# Patient Record
Sex: MALE | Race: WHITE | NOT HISPANIC OR LATINO | Employment: FULL TIME | URBAN - METROPOLITAN AREA
[De-identification: names, ages, dates, MRNs, and addresses within clinical notes are randomized per-mention and may not be internally consistent; named-entity substitution may affect disease eponyms.]

---

## 2019-03-09 ENCOUNTER — APPOINTMENT (EMERGENCY)
Dept: RADIOLOGY | Facility: HOSPITAL | Age: 59
End: 2019-03-09
Payer: COMMERCIAL

## 2019-03-09 ENCOUNTER — APPOINTMENT (OUTPATIENT)
Dept: MRI IMAGING | Facility: HOSPITAL | Age: 59
End: 2019-03-09
Payer: COMMERCIAL

## 2019-03-09 ENCOUNTER — APPOINTMENT (EMERGENCY)
Dept: CT IMAGING | Facility: HOSPITAL | Age: 59
End: 2019-03-09
Payer: COMMERCIAL

## 2019-03-09 ENCOUNTER — HOSPITAL ENCOUNTER (OUTPATIENT)
Facility: HOSPITAL | Age: 59
Setting detail: OBSERVATION
Discharge: HOME | End: 2019-03-10
Attending: EMERGENCY MEDICINE | Admitting: FAMILY MEDICINE
Payer: COMMERCIAL

## 2019-03-09 DIAGNOSIS — G51.0 BELL'S PALSY: ICD-10-CM

## 2019-03-09 DIAGNOSIS — R29.810 FACIAL DROOP: Primary | ICD-10-CM

## 2019-03-09 LAB
ALBUMIN SERPL BCP-MCNC: 3.6 G/DL (ref 3.5–5)
ALP SERPL-CCNC: 64 U/L (ref 46–116)
ALT SERPL W P-5'-P-CCNC: 35 U/L (ref 12–78)
ANION GAP BLD CALC-SCNC: 16 MMOL/L (ref 4–13)
ANION GAP SERPL CALCULATED.3IONS-SCNC: 7 MMOL/L (ref 4–13)
AST SERPL W P-5'-P-CCNC: 30 U/L (ref 5–45)
BASOPHILS # BLD AUTO: 0.02 THOUSANDS/ΜL (ref 0–0.1)
BASOPHILS NFR BLD AUTO: 0 % (ref 0–1)
BILIRUB SERPL-MCNC: 0.88 MG/DL (ref 0.2–1)
BUN BLD-MCNC: 21 MG/DL (ref 5–25)
BUN SERPL-MCNC: 22 MG/DL (ref 5–25)
CA-I BLD-SCNC: 1.15 MMOL/L (ref 1.12–1.32)
CALCIUM SERPL-MCNC: 8.6 MG/DL (ref 8.3–10.1)
CHLORIDE BLD-SCNC: 107 MMOL/L (ref 100–108)
CHLORIDE SERPL-SCNC: 106 MMOL/L (ref 100–108)
CO2 SERPL-SCNC: 27 MMOL/L (ref 21–32)
CREAT BLD-MCNC: 1.2 MG/DL (ref 0.6–1.3)
CREAT SERPL-MCNC: 1.22 MG/DL (ref 0.6–1.3)
EOSINOPHIL # BLD AUTO: 0.09 THOUSAND/ΜL (ref 0–0.61)
EOSINOPHIL NFR BLD AUTO: 2 % (ref 0–6)
ERYTHROCYTE [DISTWIDTH] IN BLOOD BY AUTOMATED COUNT: 12.6 % (ref 11.6–15.1)
GFR SERPL CREATININE-BSD FRML MDRD: 65 ML/MIN/1.73SQ M
GFR SERPL CREATININE-BSD FRML MDRD: 67 ML/MIN/1.73SQ M
GLUCOSE SERPL-MCNC: 101 MG/DL (ref 65–140)
GLUCOSE SERPL-MCNC: 105 MG/DL (ref 65–140)
HCT VFR BLD AUTO: 45.1 % (ref 36.5–49.3)
HCT VFR BLD CALC: 44 % (ref 36.5–49.3)
HGB BLD-MCNC: 14.6 G/DL (ref 12–17)
HGB BLDA-MCNC: 15 G/DL (ref 12–17)
IMM GRANULOCYTES # BLD AUTO: 0.01 THOUSAND/UL (ref 0–0.2)
IMM GRANULOCYTES NFR BLD AUTO: 0 % (ref 0–2)
LYMPHOCYTES # BLD AUTO: 1.42 THOUSANDS/ΜL (ref 0.6–4.47)
LYMPHOCYTES NFR BLD AUTO: 28 % (ref 14–44)
MCH RBC QN AUTO: 30.2 PG (ref 26.8–34.3)
MCHC RBC AUTO-ENTMCNC: 32.4 G/DL (ref 31.4–37.4)
MCV RBC AUTO: 93 FL (ref 82–98)
MONOCYTES # BLD AUTO: 0.5 THOUSAND/ΜL (ref 0.17–1.22)
MONOCYTES NFR BLD AUTO: 10 % (ref 4–12)
NEUTROPHILS # BLD AUTO: 3 THOUSANDS/ΜL (ref 1.85–7.62)
NEUTS SEG NFR BLD AUTO: 60 % (ref 43–75)
NRBC BLD AUTO-RTO: 0 /100 WBCS
PCO2 BLD: 24 MMOL/L (ref 21–32)
PLATELET # BLD AUTO: 212 THOUSANDS/UL (ref 149–390)
PMV BLD AUTO: 10.5 FL (ref 8.9–12.7)
POTASSIUM BLD-SCNC: 4.3 MMOL/L (ref 3.5–5.3)
POTASSIUM SERPL-SCNC: 4.4 MMOL/L (ref 3.5–5.3)
PROT SERPL-MCNC: 6.6 G/DL (ref 6.4–8.2)
RBC # BLD AUTO: 4.84 MILLION/UL (ref 3.88–5.62)
SODIUM BLD-SCNC: 141 MMOL/L (ref 136–145)
SODIUM SERPL-SCNC: 140 MMOL/L (ref 136–145)
SPECIMEN SOURCE: ABNORMAL
TROPONIN I SERPL-MCNC: <0.02 NG/ML
WBC # BLD AUTO: 5.04 THOUSAND/UL (ref 4.31–10.16)

## 2019-03-09 PROCEDURE — 70551 MRI BRAIN STEM W/O DYE: CPT

## 2019-03-09 PROCEDURE — 36415 COLL VENOUS BLD VENIPUNCTURE: CPT | Performed by: EMERGENCY MEDICINE

## 2019-03-09 PROCEDURE — 84484 ASSAY OF TROPONIN QUANT: CPT | Performed by: EMERGENCY MEDICINE

## 2019-03-09 PROCEDURE — 99285 EMERGENCY DEPT VISIT HI MDM: CPT

## 2019-03-09 PROCEDURE — 99219 PR INITIAL OBSERVATION CARE/DAY 50 MINUTES: CPT | Performed by: FAMILY MEDICINE

## 2019-03-09 PROCEDURE — 99291 CRITICAL CARE FIRST HOUR: CPT | Performed by: PSYCHIATRY & NEUROLOGY

## 2019-03-09 PROCEDURE — 70498 CT ANGIOGRAPHY NECK: CPT

## 2019-03-09 PROCEDURE — 85025 COMPLETE CBC W/AUTO DIFF WBC: CPT | Performed by: EMERGENCY MEDICINE

## 2019-03-09 PROCEDURE — 86235 NUCLEAR ANTIGEN ANTIBODY: CPT | Performed by: PSYCHIATRY & NEUROLOGY

## 2019-03-09 PROCEDURE — 71045 X-RAY EXAM CHEST 1 VIEW: CPT

## 2019-03-09 PROCEDURE — 82164 ANGIOTENSIN I ENZYME TEST: CPT | Performed by: PSYCHIATRY & NEUROLOGY

## 2019-03-09 PROCEDURE — 70496 CT ANGIOGRAPHY HEAD: CPT

## 2019-03-09 PROCEDURE — 80047 BASIC METABLC PNL IONIZED CA: CPT

## 2019-03-09 PROCEDURE — 85014 HEMATOCRIT: CPT

## 2019-03-09 PROCEDURE — 93005 ELECTROCARDIOGRAM TRACING: CPT

## 2019-03-09 PROCEDURE — 80053 COMPREHEN METABOLIC PANEL: CPT | Performed by: EMERGENCY MEDICINE

## 2019-03-09 PROCEDURE — 70450 CT HEAD/BRAIN W/O DYE: CPT

## 2019-03-09 PROCEDURE — 36415 COLL VENOUS BLD VENIPUNCTURE: CPT | Performed by: PSYCHIATRY & NEUROLOGY

## 2019-03-09 RX ORDER — ATORVASTATIN CALCIUM 40 MG/1
40 TABLET, FILM COATED ORAL EVERY EVENING
Status: DISCONTINUED | OUTPATIENT
Start: 2019-03-09 | End: 2019-03-10 | Stop reason: HOSPADM

## 2019-03-09 RX ORDER — PANTOPRAZOLE SODIUM 40 MG/1
40 TABLET, DELAYED RELEASE ORAL
Status: DISCONTINUED | OUTPATIENT
Start: 2019-03-09 | End: 2019-03-10 | Stop reason: HOSPADM

## 2019-03-09 RX ORDER — ASPIRIN 81 MG/1
81 TABLET, CHEWABLE ORAL DAILY
Status: DISCONTINUED | OUTPATIENT
Start: 2019-03-10 | End: 2019-03-10 | Stop reason: HOSPADM

## 2019-03-09 RX ORDER — POLYVINYL ALCOHOL 14 MG/ML
2 SOLUTION/ DROPS OPHTHALMIC
Status: DISCONTINUED | OUTPATIENT
Start: 2019-03-09 | End: 2019-03-10 | Stop reason: HOSPADM

## 2019-03-09 RX ORDER — IBUPROFEN 600 MG/1
600 TABLET ORAL EVERY 6 HOURS PRN
COMMUNITY
Start: 2018-12-30 | End: 2019-12-30

## 2019-03-09 RX ORDER — OMEPRAZOLE 20 MG/1
20 CAPSULE, DELAYED RELEASE ORAL
COMMUNITY

## 2019-03-09 RX ORDER — SILDENAFIL CITRATE 20 MG/1
TABLET ORAL
COMMUNITY
Start: 2018-07-10

## 2019-03-09 RX ORDER — ASPIRIN 325 MG
325 TABLET ORAL ONCE
Status: COMPLETED | OUTPATIENT
Start: 2019-03-09 | End: 2019-03-09

## 2019-03-09 RX ORDER — PANTOPRAZOLE SODIUM 40 MG/1
40 TABLET, DELAYED RELEASE ORAL
Status: DISCONTINUED | OUTPATIENT
Start: 2019-03-10 | End: 2019-03-09 | Stop reason: SDUPTHER

## 2019-03-09 RX ORDER — PREDNISONE 20 MG/1
60 TABLET ORAL DAILY
Status: DISCONTINUED | OUTPATIENT
Start: 2019-03-09 | End: 2019-03-10 | Stop reason: HOSPADM

## 2019-03-09 RX ADMIN — ATORVASTATIN CALCIUM 40 MG: 40 TABLET, FILM COATED ORAL at 17:23

## 2019-03-09 RX ADMIN — POLYVINYL ALCOHOL 2 DROP: 14 SOLUTION/ DROPS OPHTHALMIC at 18:50

## 2019-03-09 RX ADMIN — POLYVINYL ALCOHOL 2 DROP: 14 SOLUTION/ DROPS OPHTHALMIC at 22:59

## 2019-03-09 RX ADMIN — ENOXAPARIN SODIUM 40 MG: 40 INJECTION SUBCUTANEOUS at 17:23

## 2019-03-09 RX ADMIN — PREDNISONE 60 MG: 20 TABLET ORAL at 10:10

## 2019-03-09 RX ADMIN — IODIXANOL 85 ML: 320 INJECTION, SOLUTION INTRAVASCULAR at 09:25

## 2019-03-09 RX ADMIN — ASPIRIN 325 MG: 325 TABLET ORAL at 09:42

## 2019-03-09 RX ADMIN — POLYVINYL ALCOHOL 2 DROP: 14 SOLUTION/ DROPS OPHTHALMIC at 17:20

## 2019-03-09 RX ADMIN — PANTOPRAZOLE SODIUM 40 MG: 40 TABLET, DELAYED RELEASE ORAL at 10:11

## 2019-03-09 RX ADMIN — POLYVINYL ALCOHOL 2 DROP: 14 SOLUTION/ DROPS OPHTHALMIC at 12:15

## 2019-03-09 RX ADMIN — POLYVINYL ALCOHOL 2 DROP: 14 SOLUTION/ DROPS OPHTHALMIC at 21:00

## 2019-03-09 RX ADMIN — POLYVINYL ALCOHOL 2 DROP: 14 SOLUTION/ DROPS OPHTHALMIC at 10:30

## 2019-03-09 NOTE — QUICK NOTE
MRI is completed, negative for ischemia  This  confirms clinical impression of Bell's palsy, rather than stroke  He can be discharged from my standpoint, once deemed medically stable  Would prescribe prednisone 60 mg daily for total of 5 days, then decrease dose by 10 mg daily until off

## 2019-03-09 NOTE — ED PROVIDER NOTES
History  Chief Complaint   Patient presents with    Facial Droop     Reports to have R facial numbness and droop that started at 2230 last night  64 YO male presents with Right sided facial droop  States symptoms began last night, have been constant  Notes this originally felt last some swelling in the corner of the Right lip, some numbness at that site  Pt states droop was rather sudden, does not feel like this is improved  Pt has had drooling from the Right side of the mouth  He denies visual or auditory changes, no headaches  He has not had any rashes  States he did have some brief palpitations last night  Pt took sildafenil last night for erectile dysfunction  Wife states pt's parents both have atrial fibrillation and both currently have pacemakers  Pt does not take anticoagulation  He denies dizziness, weakness or numbness in the extremities  Pt denies CP/SOB/F/C/N/V/D/C, no dysuria, burning on urination or blood in urine  History provided by:  Patient and spouse   used: No    Extremity Weakness   Location:  Right face  Severity:  Mild  Onset quality:  Sudden  Duration:  10 hours  Timing:  Constant  Progression:  Unchanged  Chronicity:  New  Relieved by:  Nothing  Worsened by:  Nothing  Ineffective treatments:  None Tried  Associated symptoms: no abdominal pain, no chest pain, no congestion, no cough, no fever, no nausea, no rash, no shortness of breath and no vomiting        Prior to Admission Medications   Prescriptions Last Dose Informant Patient Reported? Taking?   ibuprofen (MOTRIN) 600 mg tablet 3/8/2019 at 1900  Yes Yes   Sig: Take 600 mg by mouth every 6 (six) hours as needed   omeprazole (PriLOSEC) 20 mg delayed release capsule 3/8/2019 at 1900  Yes Yes   Sig: Take 20 mg by mouth   sildenafil (REVATIO) 20 mg tablet 3/8/2019 at 1900  Yes Yes   Sig: Take one tablet by mouth one hour prior to sex as needed  Do not take more than one dose every 24 hours  Facility-Administered Medications: None       No past medical history on file  No past surgical history on file  No family history on file  I have reviewed and agree with the history as documented  Social History     Tobacco Use    Smoking status: Never Smoker    Smokeless tobacco: Never Used   Substance Use Topics    Alcohol use: Yes     Frequency: 2-3 times a week     Drinks per session: 1 or 2     Binge frequency: Never    Drug use: Never        Review of Systems   Constitutional: Negative for fever  HENT: Negative for congestion and dental problem  Eyes: Negative for visual disturbance  Respiratory: Negative for cough and shortness of breath  Cardiovascular: Negative for chest pain  Gastrointestinal: Negative for abdominal pain, nausea and vomiting  Genitourinary: Negative for dysuria and frequency  Musculoskeletal: Positive for extremity weakness  Negative for neck pain and neck stiffness  Skin: Negative for rash  Neurological: Negative for dizziness, weakness and light-headedness  Psychiatric/Behavioral: Negative for agitation, behavioral problems and confusion  All other systems reviewed and are negative  Physical Exam  Physical Exam   Constitutional: He is oriented to person, place, and time  He appears well-developed and well-nourished  HENT:   Head: Normocephalic and atraumatic  Eyes: Pupils are equal, round, and reactive to light  EOM are normal    Neck: Normal range of motion  Cardiovascular: Normal rate, regular rhythm and normal heart sounds  Pulmonary/Chest: Effort normal and breath sounds normal    Abdominal: Soft  Musculoskeletal: Normal range of motion  Neurological: He is alert and oriented to person, place, and time  Right sided facial droop which includes the    Skin: Skin is warm and dry  Psychiatric: He has a normal mood and affect  His behavior is normal    Nursing note and vitals reviewed        Vital Signs  ED Triage Vitals Temperature Pulse Respirations Blood Pressure SpO2   03/09/19 0905 03/09/19 0905 03/09/19 0905 03/09/19 0905 03/09/19 0905   98 8 °F (37 1 °C) 84 18 144/88 95 %      Temp Source Heart Rate Source Patient Position - Orthostatic VS BP Location FiO2 (%)   03/09/19 0905 03/09/19 0905 03/09/19 1115 03/09/19 1115 --   Oral Monitor Lying Left arm       Pain Score       03/09/19 0905       No Pain           Vitals:    03/09/19 1259 03/09/19 1355 03/09/19 1400 03/09/19 1500   BP: 127/80 121/83 127/96 141/89   Pulse: 88 84 75 77   Patient Position - Orthostatic VS: Lying Lying Lying Lying       Visual Acuity  Visual Acuity      Most Recent Value   L Pupil Size (mm)  3   R Pupil Size (mm)  3   L Pupil Shape  Round   R Pupil Shape  Round          ED Medications  Medications   predniSONE tablet 60 mg (60 mg Oral Given 3/9/19 1010)   pantoprazole (PROTONIX) EC tablet 40 mg (40 mg Oral Given 3/9/19 1011)   polyvinyl alcohol (LIQUIFILM TEARS) 1 4 % ophthalmic solution 2 drop (2 drops Left Eye Given 3/9/19 1215)   atorvastatin (LIPITOR) tablet 40 mg (has no administration in time range)   aspirin chewable tablet 81 mg (has no administration in time range)   enoxaparin (LOVENOX) subcutaneous injection 40 mg (has no administration in time range)   iodixanol (VISIPAQUE) 320 MG/ML injection 85 mL (85 mL Intravenous Given 3/9/19 0925)   aspirin tablet 325 mg (325 mg Oral Given 3/9/19 0942)       Diagnostic Studies  Results Reviewed     Procedure Component Value Units Date/Time    Angiotensin converting enzyme [[id number]] Collected:  03/09/19 1115    Lab Status: In process Specimen:  Blood from Arm, Right Updated:  03/09/19 1133    Sjogren's Antibodies [[id number]] Collected:  03/09/19 1115    Lab Status:   In process Specimen:  Blood from Arm, Right Updated:  03/09/19 1133    Comprehensive metabolic panel [[id number]] Collected:  03/09/19 0908    Lab Status:  Final result Specimen:  Blood from Arm, Right Updated:  03/09/19 9215 Sodium 140 mmol/L      Potassium 4 4 mmol/L      Chloride 106 mmol/L      CO2 27 mmol/L      ANION GAP 7 mmol/L      BUN 22 mg/dL      Creatinine 1 22 mg/dL      Glucose 105 mg/dL      Calcium 8 6 mg/dL      AST 30 U/L      ALT 35 U/L      Alkaline Phosphatase 64 U/L      Total Protein 6 6 g/dL      Albumin 3 6 g/dL      Total Bilirubin 0 88 mg/dL      eGFR 65 ml/min/1 73sq m     Narrative:       National Kidney Disease Education Program recommendations are as follows:  GFR calculation is accurate only with a steady state creatinine  Chronic Kidney disease less than 60 ml/min/1 73 sq  meters  Kidney failure less than 15 ml/min/1 73 sq  meters      Troponin I [[id number]]  (Normal) Collected:  03/09/19 0908    Lab Status:  Final result Specimen:  Blood from Arm, Right Updated:  03/09/19 0935     Troponin I <0 02 ng/mL     POCT Chem 8+ [[id number]]  (Abnormal) Collected:  03/09/19 0917    Lab Status:  Final result Specimen:  Venous Updated:  03/09/19 0922     SODIUM, I-STAT 141 mmol/l      Potassium, i-STAT 4 3 mmol/L      Chloride, istat 107 mmol/L      CO2, i-STAT 24 mmol/L      Anion Gap, i-STAT 16 mmol/L      Calcium, Ionized i-STAT 1 15 mmol/L      BUN, I-STAT 21 mg/dl      Creatinine, i-STAT 1 2 mg/dl      eGFR 67 ml/min/1 73sq m      Glucose, i-STAT 101 mg/dl      Hct, i-STAT 44 %      Hgb, i-STAT 15 0 g/dl      Specimen Type VENOUS    CBC and differential [[id number]] Collected:  03/09/19 0908    Lab Status:  Final result Specimen:  Blood from Arm, Right Updated:  03/09/19 0915     WBC 5 04 Thousand/uL      RBC 4 84 Million/uL      Hemoglobin 14 6 g/dL      Hematocrit 45 1 %      MCV 93 fL      MCH 30 2 pg      MCHC 32 4 g/dL      RDW 12 6 %      MPV 10 5 fL      Platelets 384 Thousands/uL      nRBC 0 /100 WBCs      Neutrophils Relative 60 %      Immat GRANS % 0 %      Lymphocytes Relative 28 %      Monocytes Relative 10 %      Eosinophils Relative 2 %      Basophils Relative 0 %      Neutrophils Absolute 3 00 Thousands/µL      Immature Grans Absolute 0 01 Thousand/uL      Lymphocytes Absolute 1 42 Thousands/µL      Monocytes Absolute 0 50 Thousand/µL      Eosinophils Absolute 0 09 Thousand/µL      Basophils Absolute 0 02 Thousands/µL                  XR stroke alert portable chest   Final Result by Rich Jones MD (03/09 1313)      No radiographic evidence of acute intrathoracic process  Workstation performed: [id number]         CTA stroke alert (head/neck)   Final Result by Rich Jones MD (03/09 0940)      CTA head:      No arterial occlusion or significant stenosis  No aneurysm  CTA neck:      No arterial occlusion or significant stenosis  No aneurysm  Findings were directly discussed with Ronald Haddad on 3/9/2019 9:29 AM                      Workstation performed: [id number]         CT stroke alert brain   Final Result by Rich Jones MD (03/09 1648)      No CT evidence of acute intracranial process  I personally discussed this study with Ronald Haddad on 3/9/2019 at 9:29 AM                      Workstation performed: [id number]         MRI Inpatient Order    (Results Pending)              Procedures  ECG 12 Lead Documentation  Date/Time: 3/9/2019 9:35 AM  Performed by: Keyona Orellana MD  Authorized by: Keyona Orellana MD     ECG reviewed by me, the ED Provider: yes    Patient location:  ED  Interpretation:     Interpretation: normal    Rate:     ECG rate:  82    ECG rate assessment: normal    Rhythm:     Rhythm: sinus rhythm    QRS:     QRS axis:  Normal    QRS intervals:  Normal  Conduction:     Conduction: normal    ST segments:     ST segments:  Normal  T waves:     T waves: normal             Phone Contacts  ED Phone Contact    ED Course                               MDM  Number of Diagnoses or Management Options  Facial droop: new and requires workup  Diagnosis management comments: 1   Right facial droop - Difficult exam with possible extremely mild involvement in the Right forehead  Will speak with neurology, order CT/CTA of head an neck  Pt is not a candidate for TPA as he has had symptoms for greater than 4 5 hours  Amount and/or Complexity of Data Reviewed  Clinical lab tests: ordered and reviewed  Tests in the radiology section of CPT®: ordered and reviewed  Obtain history from someone other than the patient: yes  Discuss the patient with other providers: yes  Independent visualization of images, tracings, or specimens: yes    Patient Progress  Patient progress: stable      Disposition  Final diagnoses:   Facial droop     Time reflects when diagnosis was documented in both MDM as applicable and the Disposition within this note     Time User Action Codes Description Comment    3/9/2019  9:40 AM Judi Elizondo E Add [R29 810] Facial droop     3/9/2019  1:17 PM Rajinder Mondragon Modify [R29 810] Facial droop     3/9/2019  1:20 PM Juju Stone Modify [R29 810] Facial droop       ED Disposition     ED Disposition Condition Date/Time Comment    Admit Stable Sat Mar 9, 2019 11:06 AM Case was discussed with Dr Cecy Rodriguez and the patient's admission status was agreed to be Admission Status: observation status to the service of Dr Cecy Rodriguez   Follow-up Information    None         Current Discharge Medication List      CONTINUE these medications which have NOT CHANGED    Details   ibuprofen (MOTRIN) 600 mg tablet Take 600 mg by mouth every 6 (six) hours as needed      omeprazole (PriLOSEC) 20 mg delayed release capsule Take 20 mg by mouth      sildenafil (REVATIO) 20 mg tablet Take one tablet by mouth one hour prior to sex as needed  Do not take more than one dose every 24 hours  No discharge procedures on file      ED Provider  Electronically Signed by           Philly Ortega MD  03/09/19 8286

## 2019-03-09 NOTE — ED NOTES
Patient transported to CT accompanied with DEIRDRE Alvarez RN  03/09/19 0930       Sera Alvarez RN  03/09/19 0930

## 2019-03-09 NOTE — CONSULTS
Consultation - Neurology   Any Serrano 63 y o  male MRN: [id number]  Unit/Bed#: ED 10 Encounter: [id number]      Assessment/Plan   Assessment:  76-year-old gentleman with past medical history limited to GERD, ED, presenting with mild left facial weakness in isolation  It appears to me more likely a lower motor neuron pattern, i e  7th nerve palsy  NIHSS 2    Plan:  1  Aspirin given  2  Patient is not tPA candidate due to duration of symptoms  3  CTA did not show any large vessel occlusion  4  Will also start prednisone, on suspicion of likely Bell's palsy  5  The patient can be admitted at least for observation overnight, preliminarily on stroke pathway with telemetry monitoring  6  MRI should be obtained ASAP which would allow us to effectively exclude (or rule in) stroke   7  If his MRI is negative for stroke, as suspected, then stroke pathway can be terminated  8  At this point there he has preserved eyelid closure, but would still utilize saline eyedrops OS, q 2 hours while awake  9  At this time of year, Lyme disease is not a realistic concern  Will check Sjogren's antibodies and Ace level (no suspicious hilar adenopathy on chest x-ray)     Physician Requesting Consult: Avel Dykes MD  Reason for Consult / Principal Problem:  Stroke alert  Hx and PE limited by:  NA  Patient last known well:  10:00 p m , 3/8/2019  Stroke alert called:  0911, 3/9/2019  Neurology time of arrival:  0915  HPI: Paul Vogt is a 63y o  year old male who presents with complaints of facial weakness  He believes that his right face was asymmetric starting last night  However noted that while he was gargling after brushing his teeth, the liquid with spilling out the left side of his mouth  Denies any associated change in speech, facial numbness, headache, lateralized weakness or numbness  No visual changes  Symptoms were persistent this morning so he presented to the emergency room    He does not note there particularly worse than last night  He has no history of hypertension, diabetes are other vascular risk factors  His wife mentions that he has noted occasional palpitations  Inpatient consult to Neurology  Consult performed by: Titi Hutchison DO  Consult ordered by: Laurita Zaragoza MD          Review of Systems   Constitutional: Negative  HENT: Positive for drooling  Negative for ear pain, facial swelling, hearing loss, trouble swallowing and voice change  Eyes: Negative  Respiratory: Negative  Cardiovascular: Positive for palpitations  Negative for chest pain  Gastrointestinal: Negative  Endocrine: Negative  Genitourinary: Negative  Musculoskeletal: Negative  Skin: Negative  Neurological: Positive for facial asymmetry  Negative for tremors, seizures, syncope, speech difficulty, weakness, light-headedness, numbness and headaches  Hematological: Negative  Psychiatric/Behavioral: Negative  Historical Information   No past medical history on file  No past surgical history on file  Social History   Social History     Substance and Sexual Activity   Alcohol Use Not Currently     Social History     Substance and Sexual Activity   Drug Use Never     Social History     Tobacco Use   Smoking Status Never Smoker     Family History: non-contributory    Review of previous medical records was  completed  Meds/Allergies   PTA meds:   Prior to Admission Medications   Prescriptions Last Dose Informant Patient Reported? Taking?   ibuprofen (MOTRIN) 600 mg tablet 3/8/2019 at 1900  Yes Yes   Sig: Take 600 mg by mouth every 6 (six) hours as needed   omeprazole (PriLOSEC) 20 mg delayed release capsule 3/8/2019 at 1900  Yes Yes   Sig: Take 20 mg by mouth   sildenafil (REVATIO) 20 mg tablet 3/8/2019 at 1900  Yes Yes   Sig: Take one tablet by mouth one hour prior to sex as needed  Do not take more than one dose every 24 hours        Facility-Administered Medications: None       No Known Allergies    Objective   Vitals:Blood pressure 157/78, pulse 83, temperature 98 5 °F (36 9 °C), temperature source Oral, resp  rate 18, weight 93 4 kg (206 lb), SpO2 99 %  ,There is no height or weight on file to calculate BMI  No intake or output data in the 24 hours ending 03/09/19 0941    Invasive Devices: Invasive Devices     Peripheral Intravenous Line            Peripheral IV 03/09/19 Left Antecubital less than 1 day    Peripheral IV 03/09/19 Right Antecubital less than 1 day                Physical Exam   Constitutional: He appears well-developed and well-nourished  No distress  HENT:   Head: Normocephalic and atraumatic  Mouth/Throat: No oropharyngeal exudate  Eyes: Pupils are equal, round, and reactive to light  Conjunctivae and EOM are normal  Right eye exhibits no discharge  Left eye exhibits no discharge  No scleral icterus  Neck: Normal range of motion  Neck supple  Cardiovascular: Normal rate, regular rhythm and normal heart sounds  Pulmonary/Chest: Effort normal and breath sounds normal    Abdominal: Soft  Bowel sounds are normal    Musculoskeletal: Normal range of motion  He exhibits no edema or deformity  Neurological: He has a normal Finger-Nose-Finger Test and a normal Heel to [employer]    Skin: Skin is warm and dry  No rash noted  He is not diaphoretic  No erythema  No pallor  Nursing note and vitals reviewed  Neurologic Exam     Mental Status   Patient is awake and alert  There is no evident difficulty with language, memory, orientation, or higher cognitive function  Cranial Nerves     CN III, IV, VI   Pupils are equal, round, and reactive to light  Extraocular motions are normal    There is mild left facial weakness upper and lower face  Cranial nerves II through XII are intact except as noted above  Motor Exam   Overall muscle tone: normal  Right arm pronator drift: absent  Left arm pronator drift: absent    Strength   Strength 5/5 except as noted       Sensory Exam   Sensation is intact and symmetric to all primary modalities  No extinction  Gait, Coordination, and Reflexes     Coordination   Finger to nose coordination: normal  Heel to shin coordination: normal    Tremor   Resting tremor: absent  Intention tremor: absent  Action tremor: absent    Reflexes   Reflexes 2+ except as noted  Gait evaluation deferred       Lab Results:   I have personally reviewed pertinent reports  , CBC:   Results from last 7 days   Lab Units 03/09/19 0917 03/09/19  0908   WBC Thousand/uL  --  5 04   RBC Million/uL  --  4 84   HEMOGLOBIN g/dL  --  14 6   I STAT HEMOGLOBIN g/dl 15 0  --    HEMATOCRIT %  --  45 1   HEMATOCRIT, ISTAT % 44  --    MCV fL  --  93   PLATELETS Thousands/uL  --  212   , BMP/CMP:   Results from last 7 days   Lab Units 03/09/19 0917 03/09/19 0908   SODIUM mmol/L  --  140   POTASSIUM mmol/L  --  4 4   CHLORIDE mmol/L  --  106   CO2 mmol/L  --  27   CO2, I-STAT mmol/L 24  --    BUN mg/dL  --  22   CREATININE mg/dL  --  1 22   GLUCOSE, ISTAT mg/dl 101  --    CALCIUM mg/dL  --  8 6   AST U/L  --  30   ALT U/L  --  35   ALK PHOS U/L  --  64   EGFR ml/min/1 73sq m 67 65     Imaging Studies: I have personally reviewed pertinent films in PACS  Head CT and CTA are entirely unremarkable  EKG, Pathology, and Other Studies: I have personally reviewed pertinent reports  Code Status: No Order  Advance Directive and Living Will:      Power of :    POLST:      Counseling / Coordination of Care  Total Critical Care time spent 35 minutes excluding procedures, teaching and family updates

## 2019-03-09 NOTE — H&P
H&P- Petrona Serrano 1960, 63 y o  male MRN: [id number]    Unit/Bed#: E4 -01 Encounter: [id number]    Primary Care Provider: Elvira Tucker MD   Date and time admitted to hospital: 3/9/2019  9:03 AM        * Facial droop  Assessment & Plan  Patient presented with acute onset left sided facial droop which is evident on exam   He reports battling a cold virus a few days previosuly  CT brain stroke alert w/o contrast and CTA head and neck w/o contrast- negative  CXR PA and lateral- negative  Since patient's presentation closely resembles Bell's palsy, was started on prednisone 60mg daily by Neurology  Also received a dose of ASA  Keep patient on temporary stroke pathway- telemetry, ASA 81mg daily, statin  MRI brain needed, and if negative can DC stroke protocol  Will keep overnight for obsv on telemetry  Discussed with patient and his wife at the bedside  VTE Prophylaxis: Enoxaparin (Lovenox)  / reason for no mechanical VTE prophylaxis Patient refused   Code Status:  Full code  POLST: There is no POLST form on file for this patient (pre-hospital)  Discussion with family:  Yes with wife at bedside    Anticipated Length of Stay:  Patient will be admitted on an Observation basis with an anticipated length of stay of  less than 2 midnights  Justification for Hospital Stay:  Facial droop    Total Time for Visit, including Counseling / Coordination of Care: 30 minutes  Greater than 50% of this total time spent on direct patient counseling and coordination of care  Chief Complaint:   Left-sided facial droop    History of Present Illness:    Alva Maharaj is a 63 y o  male patient with no past medical history, who presented to the ED at [facility] with a complaint of acute onset of left-sided facial weakness  Stated that his symptoms began last night but was more pronounced this morning while brushing his teeth    He denied any other symptomatology such as difficulty swallowing, weakness or numbness of any of his upper lower extremities, headache, chest pain, shortness of breath  Patient states that he has not had symptoms like this before  The only thing the patient mentions is that for the past couple of days he has been battling a cold virus  Upon presentation to the ED at [facility] a CT brain stroke alert was negative for any acute intracranial abnormalities  CTA of the head and neck with contrast was also negative for any abnormal findings  Chest x-ray PA and lateral was also negative  The ED physician discussed the patient's case with the on-call neurologist who suspected possible Bell's palsy in this patient due to his presentation  He was started on daily high-dose prednisone 60 mg daily  He was also given full-dose aspirin 325 mg  The patient will be admitted to the medical-surgical unit overnight for observation, placed on telemetry, and started on the stroke pathway  He will also need an MRI of the brain  Patient has no further complaints  Review of Systems:    Review of Systems   Constitutional: Negative  HENT: Negative  Eyes: Negative  Respiratory: Negative  Cardiovascular: Negative  Gastrointestinal: Negative  Endocrine: Negative  Genitourinary: Negative  Musculoskeletal: Negative  Skin: Negative  Neurological: Positive for facial asymmetry  Hematological: Negative  Past Medical and Surgical History:     No past medical history on file  No past surgical history on file  Meds/Allergies:    Prior to Admission medications    Medication Sig Start Date End Date Taking?  Authorizing Provider   ibuprofen (MOTRIN) 600 mg tablet Take 600 mg by mouth every 6 (six) hours as needed 12/30/18 12/30/19 Yes Historical Provider, MD   omeprazole (PriLOSEC) 20 mg delayed release capsule Take 20 mg by mouth   Yes Historical Provider, MD   sildenafil (REVATIO) 20 mg tablet Take one tablet by mouth one hour prior to sex as needed  Do not take more than one dose every 24 hours  7/10/18  Yes Historical Provider, MD     I have reviewed home medications using allscripts  Allergies: No Known Allergies    Social History:     Marital Status: /Civil Union   Occupation:  Unknown  Patient Pre-hospital Living Situation:  Lives at home  Patient Pre-hospital Level of Mobility:  Ambulatory  Patient Pre-hospital Diet Restrictions:  None  Substance Use History:   Social History     Substance and Sexual Activity   Alcohol Use Yes    Frequency: 2-3 times a week    Drinks per session: 1 or 2    Binge frequency: Never     Social History     Tobacco Use   Smoking Status Never Smoker   Smokeless Tobacco Never Used     Social History     Substance and Sexual Activity   Drug Use Never       Family History:    No family history on file  Physical Exam:     Vitals:   Blood Pressure: 141/89 (03/09/19 1500)  Pulse: 77 (03/09/19 1500)  Temperature: 98 5 °F (36 9 °C) (03/09/19 1500)  Temp Source: Tympanic (03/09/19 1500)  Respirations: 18 (03/09/19 1500)  Height: 6' (182 9 cm) (03/09/19 1400)  Weight - Scale: 94 5 kg (208 lb 5 4 oz) (03/09/19 1400)  SpO2: 96 % (03/09/19 1500)    Physical Exam   Constitutional: He is oriented to person, place, and time  He appears well-developed and well-nourished  No distress  HENT:   Head: Normocephalic and atraumatic  Eyes: Pupils are equal, round, and reactive to light  EOM are normal    Neck: Normal range of motion  Neck supple  No JVD present  Cardiovascular: Normal rate, regular rhythm and normal heart sounds  Pulmonary/Chest: Effort normal and breath sounds normal    Abdominal: Soft  Bowel sounds are normal    Musculoskeletal: Normal range of motion  He exhibits no edema or tenderness  Neurological: He is alert and oriented to person, place, and time  Left-sided facial droop more pronounced with smiling  Skin: Skin is warm and dry  He is not diaphoretic           Additional Data:     Lab Results: I have personally reviewed pertinent reports  Results from last 7 days   Lab Units 03/09/19 0917 03/09/19 0908   WBC Thousand/uL  --  5 04   HEMOGLOBIN g/dL  --  14 6   I STAT HEMOGLOBIN g/dl 15 0  --    HEMATOCRIT %  --  45 1   HEMATOCRIT, ISTAT % 44  --    PLATELETS Thousands/uL  --  212   NEUTROS PCT %  --  60   LYMPHS PCT %  --  28   MONOS PCT %  --  10   EOS PCT %  --  2     Results from last 7 days   Lab Units 03/09/19 0917 03/09/19 0908   SODIUM mmol/L  --  140   POTASSIUM mmol/L  --  4 4   CHLORIDE mmol/L  --  106   CO2 mmol/L  --  27   CO2, I-STAT mmol/L 24  --    BUN mg/dL  --  22   CREATININE mg/dL  --  1 22   AGAP mmol/L 16*  --    ANION GAP mmol/L  --  7   CALCIUM mg/dL  --  8 6   ALBUMIN g/dL  --  3 6   TOTAL BILIRUBIN mg/dL  --  0 88   ALK PHOS U/L  --  64   ALT U/L  --  35   AST U/L  --  30   GLUCOSE RANDOM mg/dL  --  105                       Imaging: I have personally reviewed pertinent reports  XR stroke alert portable chest   Final Result by Chely Gutierrez MD (03/09 1383)      No radiographic evidence of acute intrathoracic process  Workstation performed: [id number]         CTA stroke alert (head/neck)   Final Result by Chely Gutierrez MD (03/09 0778)      CTA head:      No arterial occlusion or significant stenosis  No aneurysm  CTA neck:      No arterial occlusion or significant stenosis  No aneurysm  Findings were directly discussed with Annia Lewis on 3/9/2019 9:29 AM                      Workstation performed: [id number]         CT stroke alert brain   Final Result by Chely Gutierrez MD (03/09 9875)      No CT evidence of acute intracranial process         I personally discussed this study with Annia Lewis on 3/9/2019 at 9:29 AM                      Workstation performed: [id number]         MRI brain wo contrast    (Results Pending)       EKG, Pathology, and Other Studies Reviewed on Admission:   · EKG:  CT brain without contrast, CTA head and neck with contrast, chest x-ray PA and lateral     Allscripts / Epic Records Reviewed: Yes     ** Please Note: This note has been constructed using a voice recognition system   **

## 2019-03-09 NOTE — PLAN OF CARE
Problem: PAIN - ADULT  Goal: Verbalizes/displays adequate comfort level or baseline comfort level  Description  Interventions:  - Encourage patient to monitor pain and request assistance  - Assess pain using appropriate pain scale  - Administer analgesics based on type and severity of pain and evaluate response  - Implement non-pharmacological measures as appropriate and evaluate response  - Consider cultural and social influences on pain and pain management  - Notify physician/advanced practitioner if interventions unsuccessful or patient reports new pain  Outcome: Progressing     Problem: INFECTION - ADULT  Goal: Absence or prevention of progression during hospitalization  Description  INTERVENTIONS:  - Assess and monitor for signs and symptoms of infection  - Monitor lab/diagnostic results  - Monitor all insertion sites, i e  indwelling lines, tubes, and drains  - Monitor endotracheal (as able) and nasal secretions for changes in amount and color  - [place] appropriate cooling/warming therapies per order  - Administer medications as ordered  - Instruct and encourage patient and family to use good hand hygiene technique  - Identify and instruct in appropriate isolation precautions for identified infection/condition  Outcome: Progressing  Goal: Absence of fever/infection during neutropenic period  Description  INTERVENTIONS:  - Monitor WBC  - Implement neutropenic guidelines  Outcome: Progressing     Problem: SAFETY ADULT  Goal: Patient will remain free of falls  Description  INTERVENTIONS:  - Assess patient frequently for physical needs  -  Identify cognitive and physical deficits and behaviors that affect risk of falls    -  [place] fall precautions as indicated by assessment   - Educate patient/family on patient safety including physical limitations  - Instruct patient to call for assistance with activity based on assessment  - Modify environment to reduce risk of injury  - Consider OT/PT consult to assist with strengthening/mobility  Outcome: Progressing  Goal: Maintain or return to baseline ADL function  Description  INTERVENTIONS:  -  Assess patient's ability to carry out ADLs; assess patient's baseline for ADL function and identify physical deficits which impact ability to perform ADLs (bathing, care of mouth/teeth, toileting, grooming, dressing, etc )  - Assess/evaluate cause of self-care deficits   - Assess range of motion  - Assess patient's mobility; develop plan if impaired  - Assess patient's need for assistive devices and provide as appropriate  - Encourage maximum independence but intervene and supervise when necessary  ¯ Involve family in performance of ADLs  ¯ Assess for home care needs following discharge   ¯ Request OT consult to assist with ADL evaluation and planning for discharge  ¯ Provide patient education as appropriate  Outcome: Progressing  Goal: Maintain or return mobility status to optimal level  Description  INTERVENTIONS:  - Assess patient's baseline mobility status (ambulation, transfers, stairs, etc )    - Identify cognitive and physical deficits and behaviors that affect mobility  - Identify mobility aids required to assist with transfers and/or ambulation (gait belt, sit-to-stand, lift, walker, cane, etc )  - [place] fall precautions as indicated by assessment  - Record patient progress and toleration of activity level on Mobility SBAR; progress patient to next Phase/Stage  - Instruct patient to call for assistance with activity based on assessment  - Request Rehabilitation consult to assist with strengthening/weightbearing, etc   Outcome: Progressing     Problem: DISCHARGE PLANNING  Goal: Discharge to home or other facility with appropriate resources  Description  INTERVENTIONS:  - Identify barriers to discharge w/patient and caregiver  - Arrange for needed discharge resources and transportation as appropriate  - Identify discharge learning needs (meds, wound care, etc )  - Arrange for interpretive services to assist at discharge as needed  - Refer to Case Management Department for coordinating discharge planning if the patient needs post-hospital services based on physician/advanced practitioner order or complex needs related to functional status, cognitive ability, or social support system  Outcome: Progressing     Problem: Knowledge Deficit  Goal: Patient/family/caregiver demonstrates understanding of disease process, treatment plan, medications, and discharge instructions  Description  Complete learning assessment and assess knowledge base  Interventions:  - Provide teaching at level of understanding  - Provide teaching via preferred learning methods  Outcome: Progressing     Problem: Neurological Deficit  Goal: Neurological status is stable or improving  Description  Interventions:  - Monitor and assess patient's level of consciousness, motor function, sensory function, and level of assistance needed for ADLs  - Monitor and report changes from baseline  Collaborate with interdisciplinary team to initiate plan and implement interventions as ordered  - Provide and maintain a safe environment  - Utilize seizure precautions  - Utilize fall precautions  - Utilize aspiration precautions  - Utilize bleeding precautions  Outcome: Progressing     Problem: Activity Intolerance/Impaired Mobility  Goal: Mobility/activity is maintained at optimum level for patient  Description  Interventions:  - Assess and monitor patient  barriers to mobility and need for assistive/adaptive devices  - Assess patient's emotional response to limitations  - Collaborate with interdisciplinary team and initiate plans and interventions as ordered  - Encourage independent activity per ability   - Maintain proper body alignment  - Perform active/passive rom as tolerated/ordered    - Plan activities to conserve energy   - Turn patient  Outcome: Progressing     Problem: Communication Impairment  Goal: Ability to express needs and understand communication  Description  Assess patient's communication skills and ability to understand information  Patient will demonstrate use of effective communication techniques, alternative methods of communication and understanding even if not able to speak  - Encourage communication and provide alternate methods of communication as needed  - Collaborate with case management/ for discharge needs  - Include patient/family/caregiver in decisions related to communication  Outcome: Progressing     Problem: Potential for Aspiration  Goal: Non-ventilated patient's risk of aspiration is minimized  Description  Assess and monitor vital signs, respiratory status, and labs (WBC)  Monitor for signs of aspiration (tachypnea, cough, rales, wheezing, cyanosis, fever)  - Assess and monitor patient's ability to swallow  - Place patient up in chair to eat if possible  - HOB up at 90 degrees to eat if unable to get patient up into chair   - Supervise patient during oral intake  - Instruct patient to take small bites  - Instruct patient to take small single sips when taking liquids  - Follow patient-specific strategies generated by speech pathologist   Outcome: Progressing  Goal: Ventilated patient's risk of aspiration is minimized  Description  Assess and monitor vital signs, respiratory status, airway cuff pressure, and labs (WBC)  Monitor for signs of aspiration (tachypnea, cough, rales, wheezing, cyanosis, fever)  - Elevate head of bed 30 degrees if patient has tube feeding   - Monitor tube feeding  Outcome: Progressing     Problem: Nutrition  Goal: Nutrition/Hydration status is improving  Description  Monitor and assess patient's nutrition/hydration status for malnutrition (ex- brittle hair, bruises, dry skin, pale skin and conjunctiva, muscle wasting, smooth red tongue, and disorientation)   Collaborate with interdisciplinary team and initiate plan and interventions as ordered  Monitor patient's weight and dietary intake as ordered or per policy  Utilize nutrition screening tool and intervene per policy  Determine patient's food preferences and provide high-protein, high-caloric foods as appropriate  - Assist patient with eating   - Allow adequate time for meals   - Encourage patient to take dietary supplement as ordered  - Collaborate with clinical nutritionist   - Include patient/family/caregiver in decisions related to nutrition    Outcome: Progressing

## 2019-03-09 NOTE — ASSESSMENT & PLAN NOTE
Patient presented with acute onset left sided facial droop which is evident on exam   He reports battling a cold virus a few days previosuly  CT brain stroke alert w/o contrast and CTA head and neck w/o contrast- negative  CXR PA and lateral- negative  Since patient's presentation closely resembles Bell's palsy, was started on prednisone 60mg daily by Neurology  Also received a dose of ASA  Keep patient on temporary stroke pathway- telemetry, ASA 81mg daily, statin  MRI brain needed, and if negative can DC stroke protocol  Will keep overnight for obsv on telemetry  Discussed with patient and his wife at the bedside

## 2019-03-10 VITALS
RESPIRATION RATE: 18 BRPM | HEART RATE: 76 BPM | SYSTOLIC BLOOD PRESSURE: 133 MMHG | BODY MASS INDEX: 28.22 KG/M2 | OXYGEN SATURATION: 94 % | WEIGHT: 208.34 LBS | DIASTOLIC BLOOD PRESSURE: 85 MMHG | TEMPERATURE: 97.8 F | HEIGHT: 72 IN

## 2019-03-10 PROBLEM — G51.0 BELL'S PALSY: Status: ACTIVE | Noted: 2019-03-10

## 2019-03-10 LAB
ANION GAP SERPL CALCULATED.3IONS-SCNC: 11 MMOL/L (ref 4–13)
BASOPHILS # BLD AUTO: 0.02 THOUSANDS/ΜL (ref 0–0.1)
BASOPHILS NFR BLD AUTO: 0 % (ref 0–1)
BUN SERPL-MCNC: 23 MG/DL (ref 5–25)
CALCIUM SERPL-MCNC: 8.7 MG/DL (ref 8.3–10.1)
CHLORIDE SERPL-SCNC: 106 MMOL/L (ref 100–108)
CHOLEST SERPL-MCNC: 194 MG/DL (ref 50–200)
CO2 SERPL-SCNC: 23 MMOL/L (ref 21–32)
CREAT SERPL-MCNC: 1.12 MG/DL (ref 0.6–1.3)
EOSINOPHIL # BLD AUTO: 0.03 THOUSAND/ΜL (ref 0–0.61)
EOSINOPHIL NFR BLD AUTO: 0 % (ref 0–6)
ERYTHROCYTE [DISTWIDTH] IN BLOOD BY AUTOMATED COUNT: 12.6 % (ref 11.6–15.1)
EST. AVERAGE GLUCOSE BLD GHB EST-MCNC: 126 MG/DL
GFR SERPL CREATININE-BSD FRML MDRD: 73 ML/MIN/1.73SQ M
GLUCOSE SERPL-MCNC: 112 MG/DL (ref 65–140)
HBA1C MFR BLD: 6 % (ref 4.2–6.3)
HCT VFR BLD AUTO: 42.4 % (ref 36.5–49.3)
HCV AB SER QL: NORMAL
HDLC SERPL-MCNC: 54 MG/DL (ref 40–60)
HGB BLD-MCNC: 13.8 G/DL (ref 12–17)
IMM GRANULOCYTES # BLD AUTO: 0.03 THOUSAND/UL (ref 0–0.2)
IMM GRANULOCYTES NFR BLD AUTO: 0 % (ref 0–2)
LDLC SERPL CALC-MCNC: 110 MG/DL (ref 0–100)
LYMPHOCYTES # BLD AUTO: 1.96 THOUSANDS/ΜL (ref 0.6–4.47)
LYMPHOCYTES NFR BLD AUTO: 20 % (ref 14–44)
MCH RBC QN AUTO: 30.3 PG (ref 26.8–34.3)
MCHC RBC AUTO-ENTMCNC: 32.5 G/DL (ref 31.4–37.4)
MCV RBC AUTO: 93 FL (ref 82–98)
MONOCYTES # BLD AUTO: 0.99 THOUSAND/ΜL (ref 0.17–1.22)
MONOCYTES NFR BLD AUTO: 10 % (ref 4–12)
NEUTROPHILS # BLD AUTO: 7.02 THOUSANDS/ΜL (ref 1.85–7.62)
NEUTS SEG NFR BLD AUTO: 70 % (ref 43–75)
NRBC BLD AUTO-RTO: 0 /100 WBCS
PLATELET # BLD AUTO: 211 THOUSANDS/UL (ref 149–390)
PMV BLD AUTO: 10.6 FL (ref 8.9–12.7)
POTASSIUM SERPL-SCNC: 3.9 MMOL/L (ref 3.5–5.3)
RBC # BLD AUTO: 4.55 MILLION/UL (ref 3.88–5.62)
SODIUM SERPL-SCNC: 140 MMOL/L (ref 136–145)
TRIGL SERPL-MCNC: 149 MG/DL
WBC # BLD AUTO: 10.05 THOUSAND/UL (ref 4.31–10.16)

## 2019-03-10 PROCEDURE — 80048 BASIC METABOLIC PNL TOTAL CA: CPT | Performed by: FAMILY MEDICINE

## 2019-03-10 PROCEDURE — 99217 PR OBSERVATION CARE DISCHARGE MANAGEMENT: CPT | Performed by: FAMILY MEDICINE

## 2019-03-10 PROCEDURE — 86803 HEPATITIS C AB TEST: CPT | Performed by: FAMILY MEDICINE

## 2019-03-10 PROCEDURE — 85025 COMPLETE CBC W/AUTO DIFF WBC: CPT | Performed by: FAMILY MEDICINE

## 2019-03-10 PROCEDURE — 80061 LIPID PANEL: CPT | Performed by: FAMILY MEDICINE

## 2019-03-10 PROCEDURE — 83036 HEMOGLOBIN GLYCOSYLATED A1C: CPT | Performed by: FAMILY MEDICINE

## 2019-03-10 RX ORDER — PREDNISONE 20 MG/1
60 TABLET ORAL DAILY
Qty: 47 TABLET | Refills: 0 | Status: SHIPPED | OUTPATIENT
Start: 2019-03-11

## 2019-03-10 RX ORDER — POLYVINYL ALCOHOL 14 MG/ML
2 SOLUTION/ DROPS OPHTHALMIC
Qty: 15 ML | Refills: 0 | Status: SHIPPED | OUTPATIENT
Start: 2019-03-10

## 2019-03-10 RX ADMIN — POLYVINYL ALCOHOL 2 DROP: 14 SOLUTION/ DROPS OPHTHALMIC at 05:30

## 2019-03-10 RX ADMIN — POLYVINYL ALCOHOL 2 DROP: 14 SOLUTION/ DROPS OPHTHALMIC at 08:14

## 2019-03-10 RX ADMIN — ENOXAPARIN SODIUM 40 MG: 40 INJECTION SUBCUTANEOUS at 08:14

## 2019-03-10 RX ADMIN — ASPIRIN 81 MG 81 MG: 81 TABLET ORAL at 08:14

## 2019-03-10 RX ADMIN — PANTOPRAZOLE SODIUM 40 MG: 40 TABLET, DELAYED RELEASE ORAL at 05:30

## 2019-03-10 RX ADMIN — PREDNISONE 60 MG: 20 TABLET ORAL at 08:14

## 2019-03-10 NOTE — PLAN OF CARE
Problem: PAIN - ADULT  Goal: Verbalizes/displays adequate comfort level or baseline comfort level  Description  Interventions:  - Encourage patient to monitor pain and request assistance  - Assess pain using appropriate pain scale  - Administer analgesics based on type and severity of pain and evaluate response  - Implement non-pharmacological measures as appropriate and evaluate response  - Consider cultural and social influences on pain and pain management  - Notify physician/advanced practitioner if interventions unsuccessful or patient reports new pain  Outcome: Progressing     Problem: INFECTION - ADULT  Goal: Absence or prevention of progression during hospitalization  Description  INTERVENTIONS:  - Assess and monitor for signs and symptoms of infection  - Monitor lab/diagnostic results  - Monitor all insertion sites, i e  indwelling lines, tubes, and drains  - Monitor endotracheal (as able) and nasal secretions for changes in amount and color  - [place] appropriate cooling/warming therapies per order  - Administer medications as ordered  - Instruct and encourage patient and family to use good hand hygiene technique  - Identify and instruct in appropriate isolation precautions for identified infection/condition  Outcome: Progressing  Goal: Absence of fever/infection during neutropenic period  Description  INTERVENTIONS:  - Monitor WBC  - Implement neutropenic guidelines  Outcome: Progressing     Problem: SAFETY ADULT  Goal: Patient will remain free of falls  Description  INTERVENTIONS:  - Assess patient frequently for physical needs  -  Identify cognitive and physical deficits and behaviors that affect risk of falls    -  [place] fall precautions as indicated by assessment   - Educate patient/family on patient safety including physical limitations  - Instruct patient to call for assistance with activity based on assessment  - Modify environment to reduce risk of injury  - Consider OT/PT consult to assist with strengthening/mobility  Outcome: Progressing  Goal: Maintain or return to baseline ADL function  Description  INTERVENTIONS:  -  Assess patient's ability to carry out ADLs; assess patient's baseline for ADL function and identify physical deficits which impact ability to perform ADLs (bathing, care of mouth/teeth, toileting, grooming, dressing, etc )  - Assess/evaluate cause of self-care deficits   - Assess range of motion  - Assess patient's mobility; develop plan if impaired  - Assess patient's need for assistive devices and provide as appropriate  - Encourage maximum independence but intervene and supervise when necessary  ¯ Involve family in performance of ADLs  ¯ Assess for home care needs following discharge   ¯ Request OT consult to assist with ADL evaluation and planning for discharge  ¯ Provide patient education as appropriate  Outcome: Progressing  Goal: Maintain or return mobility status to optimal level  Description  INTERVENTIONS:  - Assess patient's baseline mobility status (ambulation, transfers, stairs, etc )    - Identify cognitive and physical deficits and behaviors that affect mobility  - Identify mobility aids required to assist with transfers and/or ambulation (gait belt, sit-to-stand, lift, walker, cane, etc )  - [place] fall precautions as indicated by assessment  - Record patient progress and toleration of activity level on Mobility SBAR; progress patient to next Phase/Stage  - Instruct patient to call for assistance with activity based on assessment  - Request Rehabilitation consult to assist with strengthening/weightbearing, etc   Outcome: Progressing     Problem: DISCHARGE PLANNING  Goal: Discharge to home or other facility with appropriate resources  Description  INTERVENTIONS:  - Identify barriers to discharge w/patient and caregiver  - Arrange for needed discharge resources and transportation as appropriate  - Identify discharge learning needs (meds, wound care, etc )  - Arrange for interpretive services to assist at discharge as needed  - Refer to Case Management Department for coordinating discharge planning if the patient needs post-hospital services based on physician/advanced practitioner order or complex needs related to functional status, cognitive ability, or social support system  Outcome: Progressing     Problem: Knowledge Deficit  Goal: Patient/family/caregiver demonstrates understanding of disease process, treatment plan, medications, and discharge instructions  Description  Complete learning assessment and assess knowledge base  Interventions:  - Provide teaching at level of understanding  - Provide teaching via preferred learning methods  Outcome: Progressing     Problem: Neurological Deficit  Goal: Neurological status is stable or improving  Description  Interventions:  - Monitor and assess patient's level of consciousness, motor function, sensory function, and level of assistance needed for ADLs  - Monitor and report changes from baseline  Collaborate with interdisciplinary team to initiate plan and implement interventions as ordered  - Provide and maintain a safe environment  - Utilize seizure precautions  - Utilize fall precautions  - Utilize aspiration precautions  - Utilize bleeding precautions  Outcome: Progressing     Problem: Activity Intolerance/Impaired Mobility  Goal: Mobility/activity is maintained at optimum level for patient  Description  Interventions:  - Assess and monitor patient  barriers to mobility and need for assistive/adaptive devices  - Assess patient's emotional response to limitations  - Collaborate with interdisciplinary team and initiate plans and interventions as ordered  - Encourage independent activity per ability   - Maintain proper body alignment  - Perform active/passive rom as tolerated/ordered    - Plan activities to conserve energy   - Turn patient  Outcome: Progressing     Problem: Communication Impairment  Goal: Ability to express needs and understand communication  Description  Assess patient's communication skills and ability to understand information  Patient will demonstrate use of effective communication techniques, alternative methods of communication and understanding even if not able to speak  - Encourage communication and provide alternate methods of communication as needed  - Collaborate with case management/ for discharge needs  - Include patient/family/caregiver in decisions related to communication  Outcome: Progressing     Problem: Potential for Aspiration  Goal: Non-ventilated patient's risk of aspiration is minimized  Description  Assess and monitor vital signs, respiratory status, and labs (WBC)  Monitor for signs of aspiration (tachypnea, cough, rales, wheezing, cyanosis, fever)  - Assess and monitor patient's ability to swallow  - Place patient up in chair to eat if possible  - HOB up at 90 degrees to eat if unable to get patient up into chair   - Supervise patient during oral intake  - Instruct patient to take small bites  - Instruct patient to take small single sips when taking liquids  - Follow patient-specific strategies generated by speech pathologist   Outcome: Progressing  Goal: Ventilated patient's risk of aspiration is minimized  Description  Assess and monitor vital signs, respiratory status, airway cuff pressure, and labs (WBC)  Monitor for signs of aspiration (tachypnea, cough, rales, wheezing, cyanosis, fever)  - Elevate head of bed 30 degrees if patient has tube feeding   - Monitor tube feeding  Outcome: Progressing     Problem: Nutrition  Goal: Nutrition/Hydration status is improving  Description  Monitor and assess patient's nutrition/hydration status for malnutrition (ex- brittle hair, bruises, dry skin, pale skin and conjunctiva, muscle wasting, smooth red tongue, and disorientation)   Collaborate with interdisciplinary team and initiate plan and interventions as ordered  Monitor patient's weight and dietary intake as ordered or per policy  Utilize nutrition screening tool and intervene per policy  Determine patient's food preferences and provide high-protein, high-caloric foods as appropriate  - Assist patient with eating   - Allow adequate time for meals   - Encourage patient to take dietary supplement as ordered  - Collaborate with clinical nutritionist   - Include patient/family/caregiver in decisions related to nutrition    Outcome: Progressing

## 2019-03-10 NOTE — PLAN OF CARE
Problem: PAIN - ADULT  Goal: Verbalizes/displays adequate comfort level or baseline comfort level  Description  Interventions:  - Encourage patient to monitor pain and request assistance  - Assess pain using appropriate pain scale  - Administer analgesics based on type and severity of pain and evaluate response  - Implement non-pharmacological measures as appropriate and evaluate response  - Consider cultural and social influences on pain and pain management  - Notify physician/advanced practitioner if interventions unsuccessful or patient reports new pain  3/10/2019 0039 by Yomaira Norman RN  Outcome: Progressing  3/10/2019 0039 by Yomaira Norman RN  Outcome: Progressing     Problem: INFECTION - ADULT  Goal: Absence or prevention of progression during hospitalization  Description  INTERVENTIONS:  - Assess and monitor for signs and symptoms of infection  - Monitor lab/diagnostic results  - Monitor all insertion sites, i e  indwelling lines, tubes, and drains  - Monitor endotracheal (as able) and nasal secretions for changes in amount and color  - [place] appropriate cooling/warming therapies per order  - Administer medications as ordered  - Instruct and encourage patient and family to use good hand hygiene technique  - Identify and instruct in appropriate isolation precautions for identified infection/condition  3/10/2019 0039 by Yomaira Norman RN  Outcome: Progressing  3/10/2019 0039 by Yomaira Norman RN  Outcome: Progressing  Goal: Absence of fever/infection during neutropenic period  Description  INTERVENTIONS:  - Monitor WBC  - Implement neutropenic guidelines  3/10/2019 0039 by Yomaira Norman RN  Outcome: Progressing  3/10/2019 0039 by Yomaira Norman RN  Outcome: Progressing     Problem: SAFETY ADULT  Goal: Patient will remain free of falls  Description  INTERVENTIONS:  - Assess patient frequently for physical needs  -  Identify cognitive and physical deficits and behaviors that affect risk of falls   -  [place] fall precautions as indicated by assessment   - Educate patient/family on patient safety including physical limitations  - Instruct patient to call for assistance with activity based on assessment  - Modify environment to reduce risk of injury  - Consider OT/PT consult to assist with strengthening/mobility  3/10/2019 0039 by Malachi Serna RN  Outcome: Progressing  3/10/2019 0039 by Malachi Serna RN  Outcome: Progressing  Goal: Maintain or return to baseline ADL function  Description  INTERVENTIONS:  -  Assess patient's ability to carry out ADLs; assess patient's baseline for ADL function and identify physical deficits which impact ability to perform ADLs (bathing, care of mouth/teeth, toileting, grooming, dressing, etc )  - Assess/evaluate cause of self-care deficits   - Assess range of motion  - Assess patient's mobility; develop plan if impaired  - Assess patient's need for assistive devices and provide as appropriate  - Encourage maximum independence but intervene and supervise when necessary  ¯ Involve family in performance of ADLs  ¯ Assess for home care needs following discharge   ¯ Request OT consult to assist with ADL evaluation and planning for discharge  ¯ Provide patient education as appropriate  3/10/2019 0039 by Malachi Serna RN  Outcome: Progressing  3/10/2019 0039 by Malachi Serna RN  Outcome: Progressing  Goal: Maintain or return mobility status to optimal level  Description  INTERVENTIONS:  - Assess patient's baseline mobility status (ambulation, transfers, stairs, etc )    - Identify cognitive and physical deficits and behaviors that affect mobility  - Identify mobility aids required to assist with transfers and/or ambulation (gait belt, sit-to-stand, lift, walker, cane, etc )  - [place] fall precautions as indicated by assessment  - Record patient progress and toleration of activity level on Mobility SBAR; progress patient to next Phase/Stage  - Instruct patient to call for assistance with activity based on assessment  - Request Rehabilitation consult to assist with strengthening/weightbearing, etc   3/10/2019 0039 by Abby Vu RN  Outcome: Progressing  3/10/2019 0039 by Abby Vu RN  Outcome: Progressing     Problem: DISCHARGE PLANNING  Goal: Discharge to home or other facility with appropriate resources  Description  INTERVENTIONS:  - Identify barriers to discharge w/patient and caregiver  - Arrange for needed discharge resources and transportation as appropriate  - Identify discharge learning needs (meds, wound care, etc )  - Arrange for interpretive services to assist at discharge as needed  - Refer to Case Management Department for coordinating discharge planning if the patient needs post-hospital services based on physician/advanced practitioner order or complex needs related to functional status, cognitive ability, or social support system  3/10/2019 0039 by Abby Vu RN  Outcome: Progressing  3/10/2019 0039 by Abby Vu RN  Outcome: Progressing     Problem: Knowledge Deficit  Goal: Patient/family/caregiver demonstrates understanding of disease process, treatment plan, medications, and discharge instructions  Description  Complete learning assessment and assess knowledge base  Interventions:  - Provide teaching at level of understanding  - Provide teaching via preferred learning methods  3/10/2019 0039 by Abby Vu RN  Outcome: Progressing  3/10/2019 0039 by Abby Vu RN  Outcome: Progressing     Problem: Neurological Deficit  Goal: Neurological status is stable or improving  Description  Interventions:  - Monitor and assess patient's level of consciousness, motor function, sensory function, and level of assistance needed for ADLs  - Monitor and report changes from baseline  Collaborate with interdisciplinary team to initiate plan and implement interventions as ordered  - Provide and maintain a safe environment    - Utilize seizure precautions  - Utilize fall precautions  - Utilize aspiration precautions  - Utilize bleeding precautions  3/10/2019 0039 by Td Gutierrez RN  Outcome: Progressing  3/10/2019 0039 by Td Gutierrez RN  Outcome: Progressing     Problem: Activity Intolerance/Impaired Mobility  Goal: Mobility/activity is maintained at optimum level for patient  Description  Interventions:  - Assess and monitor patient  barriers to mobility and need for assistive/adaptive devices  - Assess patient's emotional response to limitations  - Collaborate with interdisciplinary team and initiate plans and interventions as ordered  - Encourage independent activity per ability   - Maintain proper body alignment  - Perform active/passive rom as tolerated/ordered  - Plan activities to conserve energy   - Turn patient  3/10/2019 0039 by Td Gutierrez RN  Outcome: Progressing  3/10/2019 0039 by Td Gutierrez RN  Outcome: Progressing     Problem: Communication Impairment  Goal: Ability to express needs and understand communication  Description  Assess patient's communication skills and ability to understand information  Patient will demonstrate use of effective communication techniques, alternative methods of communication and understanding even if not able to speak  - Encourage communication and provide alternate methods of communication as needed  - Collaborate with case management/ for discharge needs  - Include patient/family/caregiver in decisions related to communication  3/10/2019 0039 by Td Gutierrez RN  Outcome: Progressing  3/10/2019 0039 by Td Gutierrez RN  Outcome: Progressing     Problem: Potential for Aspiration  Goal: Non-ventilated patient's risk of aspiration is minimized  Description  Assess and monitor vital signs, respiratory status, and labs (WBC)  Monitor for signs of aspiration (tachypnea, cough, rales, wheezing, cyanosis, fever)      - Assess and monitor patient's ability to swallow  - Place patient up in chair to eat if possible  - HOB up at 90 degrees to eat if unable to get patient up into chair   - Supervise patient during oral intake  - Instruct patient to take small bites  - Instruct patient to take small single sips when taking liquids  - Follow patient-specific strategies generated by speech pathologist   3/10/2019 0039 by Timothy Arroyo RN  Outcome: Progressing  3/10/2019 0039 by Timothy Arroyo RN  Outcome: Progressing  Goal: Ventilated patient's risk of aspiration is minimized  Description  Assess and monitor vital signs, respiratory status, airway cuff pressure, and labs (WBC)  Monitor for signs of aspiration (tachypnea, cough, rales, wheezing, cyanosis, fever)  - Elevate head of bed 30 degrees if patient has tube feeding   - Monitor tube feeding  3/10/2019 0039 by Timothy Arroyo RN  Outcome: Progressing  3/10/2019 0039 by Timothy Arroyo RN  Outcome: Progressing     Problem: Nutrition  Goal: Nutrition/Hydration status is improving  Description  Monitor and assess patient's nutrition/hydration status for malnutrition (ex- brittle hair, bruises, dry skin, pale skin and conjunctiva, muscle wasting, smooth red tongue, and disorientation)  Collaborate with interdisciplinary team and initiate plan and interventions as ordered  Monitor patient's weight and dietary intake as ordered or per policy  Utilize nutrition screening tool and intervene per policy  Determine patient's food preferences and provide high-protein, high-caloric foods as appropriate  - Assist patient with eating   - Allow adequate time for meals   - Encourage patient to take dietary supplement as ordered  - Collaborate with clinical nutritionist   - Include patient/family/caregiver in decisions related to nutrition    3/10/2019 0039 by Timothy Arroyo RN  Outcome: Progressing  3/10/2019 0039 by Timothy Arroyo RN  Outcome: Progressing

## 2019-03-10 NOTE — SPEECH THERAPY NOTE
Speech Language/Pathology    Consult received  Records reviewed  Pt admitted c facial droop  MRI negative and pt passed RN Dysphagia Assessment  Communication deficits denied  No additional inpatient Speech Pathology evaluation appears indicated at this time  Please re-consult if additional concerns arise

## 2019-03-10 NOTE — DISCHARGE SUMMARY
Discharge- Aram Matos 1960, 63 y o  male MRN: [id number]    Unit/Bed#: E4 -01 Encounter: [id number]    Primary Care Provider: Juan Antonio Plasencia MD   Date and time admitted to hospital: 3/9/2019  9:03 AM        * Bell's palsy  Assessment & Plan  Patient presented with acute onset left sided facial droop which is evident on exam   He reports battling a cold virus a few days previosuly  CT brain stroke alert w/o contrast and CTA head and neck w/o contrast- negative  CXR PA and lateral- negative  Since patient's presentation closely resembles Bell's palsy, was started on prednisone 60mg daily by Neurology  MRI brain was negative  Stroke protocol discontinued  Negative brain MRI confirms clinical impression of Bell's palsy  Will continue patient on prednisone 60 mg daily for 5 days and taper dose down by 10 mg every 5 days until finished  Patient is neurologically stable for discharge to home today  Discharging Physician / Practitioner: Brynn Chin MD  PCP: Juan Antonio Plasencia MD  Admission Date:   Admission Orders (From admission, onward)    Ordered        03/09/19 1106  Place in Observation  Once     Order ID Start Status   [id number] 03/09/19 1107 Completed              Discharge Date: 03/10/19    Resolved Problems  Date Reviewed: 3/10/2019    None          Consultations During Hospital Stay:  · Neurology    Procedures Performed:   · CT brain stroke alert 03/09/2019, CTA head and neck stroke alert 03/09/2019, portable chest x-ray stroke alert 03/09/2019, MRI brain without contrast 03/09/2019, 12 lead EKG 03/09/2019  Significant Findings / Test Results:   · CT brain stroke alert 3/9-no CT evidence of acute intracranial process  · CTA head and neck stroke alert 3/9-CTA head:  No arterial occlusion or significant stenosis  No aneurysm  CTA neck:  No arterial occlusion or significant stenosis  No aneurysm    · Portable chest x-ray stroke alert 3/9-no radiographic evidence of acute intrathoracic process  · MRI brain without contrast 3/9-no acute intracranial pathology  Two small white matter lesions within the left frontal lobe likely early chronic microangiopathic change  · Twelve lead EKG 3/9-normal sinus rhythm  Incidental Findings:   · None     Test Results Pending at Discharge (will require follow up): · None     Outpatient Tests Requested:  · None    Complications:  None    Reason for Admission:  Left-sided facial droop due to Bell's palsy  Hospital Course:     Myrtle Lowe is a 63 y o  male patient who originally presented to the hospital on 3/9/2019 due to acute onset of left-sided facial droop which occurred on the morning of the patient's admission to the hospital   Patient related that while he was brushing his teeth and spitting water out of his mouth he noticed that the left side of his face was weak and water just dropped out of his mouth  Upon presentation to the ED at [facility] he did have a CT of the brain that was negative as well as a CTA head and neck that was also negative  His case was discussed with the on-call neurologist who felt that the patient's presentation closely resembled Bell's palsy as opposed to an acute stroke  He was started on prednisone 60 mg daily by the on-call neurologist   The patient also received a loading dose of aspirin 325 mg  He was kept on the stroke pathway however with telemetry, aspirin 81 mg daily, and a statin  His symptoms generally remained the same with left-sided facial droop and forehead sparing  MRI of brain was performed which showed no evidence of acute intracranial abnormality  The patient was then taken of the stroke protocol and was cleared for discharge to home by the on-call neurologist   Patient will be discharged to home with prednisone 60 mg daily to take for total of 5 days and to taper down the dose by 10 mg every 5 days until finished    He was also discharged home on some liquid tears to use every 2 hours while awake  The patient is currently hemodynamically and neurologically stable for discharge to home today  Please see above list of diagnoses and related plan for additional information  Condition at Discharge: stable     Discharge Day Visit / Exam:     * Please refer to separate progress note for these details *    Discussion with Family:  Yes with wife at bedside    Discharge instructions/Information to patient and family:   See after visit summary for information provided to patient and family  Provisions for Follow-Up Care:  See after visit summary for information related to follow-up care and any pertinent home health orders  Disposition:     Home    For Discharges to [facility] SNF:   · Not Applicable to this Patient - Not Applicable to this Patient    Planned Readmission:  No     Discharge Statement:  I spent 15 minutes discharging the patient  This time was spent on the day of discharge  I had direct contact with the patient on the day of discharge  Greater than 50% of the total time was spent examining patient, answering all patient questions, arranging and discussing plan of care with patient as well as directly providing post-discharge instructions  Additional time then spent on discharge activities  Discharge Medications:  See after visit summary for reconciled discharge medications provided to patient and family        ** Please Note: This note has been constructed using a voice recognition system **

## 2019-03-10 NOTE — UTILIZATION REVIEW
Initial Clinical Review    Admission: Date/Time/Statement: OBSERVATION 3/9/19 @1106  Orders Placed This Encounter   Procedures    Place in Observation     Standing Status:   Standing     Number of Occurrences:   1     Order Specific Question:   Admitting Physician     Answer:   Media Hoops [[id number]]     Order Specific Question:   Level of Care     Answer:   Med Surg [[id number]]     ED: Date/Time/Mode of Arrival:   ED Arrival Information     Expected Arrival Acuity Means of Arrival Escorted By Service Admission Type    - 3/9/2019 08:59 Emergent Walk-In Family Member General Medicine Emergency    Arrival Complaint    numbness in face        Chief Complaint:   Chief Complaint   Patient presents with    Facial Droop     Reports to have R facial numbness and droop that started at 2230 last night  Assessment/Plan: 62 yo m to ED from home admitted under OBS due to L facial droop  Acute onset L facial weakness since the night pta, more pronounced while brushing teeth pta with liquid coming out of his mouth  Had a cold over prior few days  Stroke alert called, imaging negative  Neurology consulted and suspecting bell's palsy, high dose prednisone started with full dose asa  Stroke pathway implemented, MRI pending  Per neuro: not TPA candidate due to sx duration, likely bell's palsy, needs MRI to r/o stroke, lyme dz not a realistic concern at this time of year, check sjogren's and ace level       ED Vital Signs:   ED Triage Vitals   Temperature Pulse Respirations Blood Pressure SpO2   03/09/19 0905 03/09/19 0905 03/09/19 0905 03/09/19 0905 03/09/19 0905   98 8 °F (37 1 °C) 84 18 144/88 95 %      Temp Source Heart Rate Source Patient Position - Orthostatic VS BP Location FiO2 (%)   03/09/19 0905 03/09/19 0905 03/09/19 1115 03/09/19 1115 --   Oral Monitor Lying Left arm       Pain Score       03/09/19 0905       No Pain        Wt Readings from Last 1 Encounters:   03/09/19 94 5 kg (208 lb 5 4 oz)     Pertinent Labs/Diagnostic Test Results:   A gap 16  MRI brain: No acute intracranial pathology   2 small white matter lesions within the left frontal lobe likely early chronic microangiopathic change  PCXR: nothing acute  CTA head:No arterial occlusion or significant stenosis  No aneurysm  CTA neck:No arterial occlusion or significant stenosis  No aneurysm  CT brain: (stroke alert): nothing acute    ED Treatment:   Medication Administration from 03/09/2019 0859 to 03/09/2019 1407       Date/Time Order Dose Route Action     03/09/2019 0942 aspirin tablet 325 mg 325 mg Oral Given     03/09/2019 1010 predniSONE tablet 60 mg 60 mg Oral Given     03/09/2019 1011 pantoprazole (PROTONIX) EC tablet 40 mg 40 mg Oral Given        Past Medical/Surgical History: Active Ambulatory Problems     Diagnosis Date Noted    Facial droop 03/09/2019       Admitting Diagnosis: Facial droop [R29 810]  Age/Sex: 63 y o  male  Admission Orders:  Scheduled Meds:   Current Facility-Administered Medications:  aspirin 81 mg Oral Daily   atorvastatin 40 mg Oral QPM   enoxaparin 40 mg Subcutaneous Daily   pantoprazole 40 mg Oral Early Morning   polyvinyl alcohol 2 drop Left Eye Q2H While awake   predniSONE 60 mg Oral Daily     Neuro checks Every 1 hour x 4 hours, then every 2 hours x 4, then every 4 hours x 72 hours  NIH stroke scale  Tele  Stroke education  Dysphagia eval  Incentive spirom hourly wa  oob as osorio  A1c, hep c antb in am  Angiotensin converting enzyme, sjogren's antb x 1  hse diet  Cons neuro  Cons case mgmt  Cons nutrition  Cons PT/OT      Network Utilization Review Department  Phone: [phone]; Fax [phone]  [email or web address]  ATTENTION: Please call with any questions or concerns to [phone]  and carefully listen to the prompts so that you are directed to the right person  Send all requests for admission clinical reviews, approved or denied determinations and any other requests to fax [phone]  All voicemails are confidential

## 2019-03-10 NOTE — ASSESSMENT & PLAN NOTE
Patient presented with acute onset left sided facial droop which is evident on exam   He reports battling a cold virus a few days previosuly  CT brain stroke alert w/o contrast and CTA head and neck w/o contrast- negative  CXR PA and lateral- negative  Since patient's presentation closely resembles Bell's palsy, was started on prednisone 60mg daily by Neurology  MRI brain was negative  Stroke protocol discontinued  Negative brain MRI confirms clinical impression of Bell's palsy  Will continue patient on prednisone 60 mg daily for 5 days and taper dose down by 10 mg every 5 days until finished  Patient is neurologically stable for discharge to home today

## 2019-03-11 LAB
ACE SERPL-CCNC: 41 U/L (ref 14–82)
ATRIAL RATE: 82 BPM
ENA SS-A AB SER-ACNC: <0.2 AI (ref 0–0.9)
ENA SS-B AB SER-ACNC: <0.2 AI (ref 0–0.9)
P AXIS: 65 DEGREES
PR INTERVAL: 146 MS
QRS AXIS: 33 DEGREES
QRSD INTERVAL: 90 MS
QT INTERVAL: 346 MS
QTC INTERVAL: 404 MS
T WAVE AXIS: 46 DEGREES
VENTRICULAR RATE: 82 BPM

## 2019-03-11 PROCEDURE — 93010 ELECTROCARDIOGRAM REPORT: CPT | Performed by: INTERNAL MEDICINE

## 2019-03-19 ENCOUNTER — OFFICE VISIT (OUTPATIENT)
Dept: FAMILY MEDICINE CLINIC | Facility: CLINIC | Age: 59
End: 2019-03-19
Payer: COMMERCIAL

## 2019-03-19 VITALS
HEIGHT: 72 IN | HEART RATE: 78 BPM | OXYGEN SATURATION: 98 % | SYSTOLIC BLOOD PRESSURE: 124 MMHG | DIASTOLIC BLOOD PRESSURE: 80 MMHG | BODY MASS INDEX: 28.44 KG/M2 | WEIGHT: 210 LBS

## 2019-03-19 DIAGNOSIS — Z23 ENCOUNTER FOR VACCINATION: Primary | ICD-10-CM

## 2019-03-19 DIAGNOSIS — Z13.29 SCREENING FOR THYROID DISORDER: ICD-10-CM

## 2019-03-19 DIAGNOSIS — Z12.11 COLON CANCER SCREENING: ICD-10-CM

## 2019-03-19 DIAGNOSIS — Z11.59 NEED FOR HEPATITIS C SCREENING TEST: ICD-10-CM

## 2019-03-19 DIAGNOSIS — G51.0 BELL'S PALSY: ICD-10-CM

## 2019-03-19 DIAGNOSIS — N52.9 ERECTILE DYSFUNCTION, UNSPECIFIED ERECTILE DYSFUNCTION TYPE: ICD-10-CM

## 2019-03-19 DIAGNOSIS — Z76.89 ENCOUNTER TO ESTABLISH CARE WITH NEW DOCTOR: ICD-10-CM

## 2019-03-19 PROCEDURE — 3008F BODY MASS INDEX DOCD: CPT | Performed by: INTERNAL MEDICINE

## 2019-03-19 PROCEDURE — 99204 OFFICE O/P NEW MOD 45 MIN: CPT | Performed by: INTERNAL MEDICINE

## 2019-03-19 PROCEDURE — 1036F TOBACCO NON-USER: CPT | Performed by: INTERNAL MEDICINE

## 2019-03-19 RX ORDER — TADALAFIL 20 MG/1
20 TABLET ORAL DAILY PRN
Qty: 10 TABLET | Refills: 0 | Status: SHIPPED | OUTPATIENT
Start: 2019-03-19

## 2019-03-19 NOTE — PROGRESS NOTES
Assessment/Plan:         Diagnoses and all orders for this visit:    Encounter for vaccination  -     TDAP VACCINE GREATER THAN OR EQUAL TO 6YO IM  -     influenza vaccine, [id number], quadrivalent, recombinant, PF, 0 5 mL, for patients 18 yr+ (FLUBLOK)    Encounter to establish care with new doctor    Bell's palsy  -     Lyme Antibody Profile with reflex to WB; Future  Continue with taper down prednisone  Continue with home facial exercises expected recovery  Erectile dysfunction, unspecified erectile dysfunction type  -     PSA, total and free; Future  -     Testosterone, Free and Weakly Bound  -     Prolactin; Future  -     tadalafil (CIALIS) 20 MG tablet; Take 1 tablet (20 mg total) by mouth daily as needed for erectile dysfunction  Discussed laboratory work to rule out hypogonadism  Colon cancer screening  -     Ambulatory referral to Gastroenterology; Future  -     Occult Blood, Fecal Immunochemical; Future    Need for hepatitis C screening test  -     Hepatitis C antibody; Future    Tremors  Likely essential; check a TSH   -     TSH, 3rd generation with Free T4 reflex; Future    Other orders  -     Probiotic Product (PROBIOTIC-10 PO); Take by mouth        Subjective:      Patient ID: Carley Busitllo is a 63 y o  male  HPI  Patient is here to establish care  He used to be with [facility] and currently moved over due to insurance change to [address] [place] [address]  He was in the ER with Bell's palsy 03/09/2019  Patient underwent CT scan of the head with an MRI with reassuring findings  He was seen by Neurology as well  Patient was placed on prednisone with taper down dose  All the order lab studies unremarkable except for slightly elevated cholesterol  Patient does remark he had a mild upper respiratory infection proceeding is episodes as above  He reports his speech has improved tremendously with the mild lasp  Still difficult to make a puff in his cheek  His eyes are closing well    He does an eye appointment this afternoon  Denies any discomfort  Patient reports no difficulty with swallowing  He does report hyperacusis but symptoms are improving  Is facial sensations are also improving  She still does not feel left side when he brushes his teeth  Denies any tick bite rash or being out in the woods  Patient had a colonoscopy several years ago and is due for recall  Patient is up-to-date with flu vaccine  The following portions of the patient's history were reviewed and updated as appropriate: allergies, current medications, past family history, past medical history, past social history, past surgical history and problem list     Current Outpatient Medications:     ibuprofen (MOTRIN) 600 mg tablet, Take 600 mg by mouth every 6 (six) hours as needed, Disp: , Rfl:     omeprazole (PriLOSEC) 20 mg delayed release capsule, Take 20 mg by mouth, Disp: , Rfl:     polyvinyl alcohol (LIQUIFILM TEARS) 1 4 % ophthalmic solution, Administer 2 drops into the left eye every 2 (two) hours while awake, Disp: 15 mL, Rfl: 0    predniSONE 20 mg tablet, Take 3 tablets (60 mg total) by mouth daily Take 3 tablets daily for next 3 days beginning 3/11, then 2 5 tablets daily for 5 days, 2 tablets daily for 5 days, 1 5 tablets daily for 5 days, 1 tablet daily for 5 days, 0 5 tablet daily for 5 days and stop , Disp: 47 tablet, Rfl: 0    Probiotic Product (PROBIOTIC-10 PO), Take by mouth, Disp: , Rfl:     sildenafil (REVATIO) 20 mg tablet, Take one tablet by mouth one hour prior to sex as needed  Do not take more than one dose every 24 hours  , Disp: , Rfl:     tadalafil (CIALIS) 20 MG tablet, Take 1 tablet (20 mg total) by mouth daily as needed for erectile dysfunction, Disp: 10 tablet, Rfl: 0  Review of Systems   Constitutional: Negative for appetite change, chills, fatigue, fever and unexpected weight change  HENT: Negative for congestion, hearing loss, postnasal drip, trouble swallowing and voice change      Eyes: Negative for pain and visual disturbance  Respiratory: Negative for cough, chest tightness and shortness of breath  Cardiovascular: Negative for chest pain, palpitations and leg swelling  Gastrointestinal: Negative for abdominal pain, blood in stool, constipation, diarrhea, nausea and vomiting  Endocrine: Negative for cold intolerance, heat intolerance, polydipsia and polyphagia  Genitourinary: Negative for difficulty urinating, flank pain, frequency, genital sores and hematuria  Erectile dysfunction  No loss of libido   Musculoskeletal: Negative for arthralgias, back pain, gait problem, joint swelling and myalgias  Skin: Negative for rash  Neurological: Positive for tremors, facial asymmetry, speech difficulty and numbness  Negative for dizziness, weakness, light-headedness and headaches  As above   Hematological: Negative for adenopathy  Does not bruise/bleed easily  Psychiatric/Behavioral: Negative for confusion, dysphoric mood and sleep disturbance  Objective:      /80 (BP Location: Right arm, Patient Position: Sitting, Cuff Size: Standard)   Pulse 78   Ht 6' (1 829 m)   Wt 95 3 kg (210 lb)   SpO2 98%   BMI 28 48 kg/m²          Physical Exam   Constitutional: He is oriented to person, place, and time  He appears well-developed and well-nourished  No distress  HENT:   Right Ear: External ear normal    Mouth/Throat: Oropharynx is clear and moist  No oropharyngeal exudate  No vesicles left auditory canal   Eyes: Pupils are equal, round, and reactive to light  No scleral icterus  Neck: No thyromegaly present  Cardiovascular: Normal rate, regular rhythm, normal heart sounds and intact distal pulses  No murmur heard  Pulmonary/Chest: Effort normal and breath sounds normal  No respiratory distress  He has no wheezes  He has no rales  Abdominal: Soft  Bowel sounds are normal  There is no tenderness  There is no rebound  Musculoskeletal: He exhibits no edema  Lymphadenopathy:     He has no cervical adenopathy  Neurological: He is alert and oriented to person, place, and time  He has normal reflexes  Left facial droop  Decreased wrinkling left forehead  Good eye closure  Mild dysarthria  Decreased sensation left side  No rash seen      Left more than the right tremors negative cogwheel rigidity   good strength   Skin: Skin is warm  Psychiatric: He has a normal mood and affect   His behavior is normal  Judgment and thought content normal

## 2019-03-22 ENCOUNTER — APPOINTMENT (OUTPATIENT)
Dept: LAB | Facility: MEDICAL CENTER | Age: 59
End: 2019-03-22
Payer: COMMERCIAL

## 2019-03-22 DIAGNOSIS — Z13.29 SCREENING FOR THYROID DISORDER: ICD-10-CM

## 2019-03-22 DIAGNOSIS — N52.9 ERECTILE DYSFUNCTION, UNSPECIFIED ERECTILE DYSFUNCTION TYPE: ICD-10-CM

## 2019-03-22 DIAGNOSIS — G51.0 BELL'S PALSY: ICD-10-CM

## 2019-03-22 DIAGNOSIS — Z11.59 NEED FOR HEPATITIS C SCREENING TEST: ICD-10-CM

## 2019-03-22 LAB
HCV AB SER QL: NORMAL
PROLACTIN SERPL-MCNC: 16.8 NG/ML (ref 2.5–17.4)
TSH SERPL DL<=0.05 MIU/L-ACNC: 2.21 UIU/ML (ref 0.36–3.74)

## 2019-03-22 PROCEDURE — 36415 COLL VENOUS BLD VENIPUNCTURE: CPT | Performed by: INTERNAL MEDICINE

## 2019-03-22 PROCEDURE — 84403 ASSAY OF TOTAL TESTOSTERONE: CPT | Performed by: INTERNAL MEDICINE

## 2019-03-22 PROCEDURE — 86618 LYME DISEASE ANTIBODY: CPT

## 2019-03-22 PROCEDURE — 84402 ASSAY OF FREE TESTOSTERONE: CPT | Performed by: INTERNAL MEDICINE

## 2019-03-22 PROCEDURE — 86803 HEPATITIS C AB TEST: CPT

## 2019-03-22 PROCEDURE — 84146 ASSAY OF PROLACTIN: CPT

## 2019-03-22 PROCEDURE — 84153 ASSAY OF PSA TOTAL: CPT

## 2019-03-22 PROCEDURE — 84154 ASSAY OF PSA FREE: CPT

## 2019-03-22 PROCEDURE — 84443 ASSAY THYROID STIM HORMONE: CPT

## 2019-03-23 LAB
PSA FREE MFR SERPL: 31 %
PSA FREE SERPL-MCNC: 0.31 NG/ML
PSA SERPL-MCNC: 1 NG/ML (ref 0–4)

## 2019-03-25 ENCOUNTER — APPOINTMENT (OUTPATIENT)
Dept: LAB | Facility: MEDICAL CENTER | Age: 59
End: 2019-03-25
Payer: COMMERCIAL

## 2019-03-25 LAB
B BURGDOR IGG SER IA-ACNC: 0.06
B BURGDOR IGM SER IA-ACNC: 0.22

## 2019-03-26 ENCOUNTER — TELEPHONE (OUTPATIENT)
Dept: FAMILY MEDICINE CLINIC | Facility: CLINIC | Age: 59
End: 2019-03-26

## 2019-03-26 LAB
DEPRECATED TESTOST FREE FR SERPL: 109.5 NG/DL (ref 40–250)
TESTOST SERPL-MCNC: 337 NG/DL (ref 264–916)
TESTOSTERONE.FREE+WB MFR SERPL: 32.5 % (ref 9–46)

## 2019-05-31 ENCOUNTER — TELEPHONE (OUTPATIENT)
Dept: FAMILY MEDICINE CLINIC | Facility: CLINIC | Age: 59
End: 2019-05-31

## 2019-05-31 DIAGNOSIS — R06.81 WITNESSED EPISODE OF APNEA: Primary | ICD-10-CM

## 2019-06-07 ENCOUNTER — TELEPHONE (OUTPATIENT)
Dept: FAMILY MEDICINE CLINIC | Facility: CLINIC | Age: 59
End: 2019-06-07

## 2019-08-02 ENCOUNTER — OFFICE VISIT (OUTPATIENT)
Dept: SLEEP CENTER | Facility: CLINIC | Age: 59
End: 2019-08-02
Payer: COMMERCIAL

## 2019-08-02 VITALS
HEART RATE: 74 BPM | WEIGHT: 208 LBS | SYSTOLIC BLOOD PRESSURE: 122 MMHG | DIASTOLIC BLOOD PRESSURE: 76 MMHG | BODY MASS INDEX: 28.17 KG/M2 | HEIGHT: 72 IN

## 2019-08-02 DIAGNOSIS — I49.9 IRREGULAR HEART RATE: ICD-10-CM

## 2019-08-02 DIAGNOSIS — R06.83 SNORING: ICD-10-CM

## 2019-08-02 DIAGNOSIS — R06.81 WITNESSED EPISODE OF APNEA: Primary | ICD-10-CM

## 2019-08-02 PROCEDURE — 99204 OFFICE O/P NEW MOD 45 MIN: CPT | Performed by: NURSE PRACTITIONER

## 2019-08-02 NOTE — PATIENT INSTRUCTIONS
1  Schedule home sleep study with CPAP titration study to follow, if needed  2  Schedule follow up visit to review study results  3   Schedule DME appointment for CPAP equipment, if needed

## 2019-08-02 NOTE — PROGRESS NOTES
Consultation - [address] [place] [address], 1960, MRN: [id number]    8/2/2019        Reason for Consult / Principal Problem:    Evaluation of possible Obstructive Sleep Apnea       Thank you for the opportunity of participating in the evaluation and care of this patient in the Sleep Clinic at [facility]  Subjective:     HPI: Aneta Roy is a 63y o  year old male  He presents for evaluation of possible sleep apnea  His wife has noted loud snoring with periods of apnea and loud gasping  Symptoms have been ongoing for several years  He initiates sleep with no difficulty  He denies frequent night time awakenings  He does not experience significant daytime sleepiness  He mentions that both of his parents have pacemakers  He does not have any comorbid conditions  Review of Systems      Genitourinary difficulty with erection   Cardiology none   Gastrointestinal frequent heartburn/acid reflux   Neurology none   Constitutional none   Integumentary none   Psychiatry none   Musculoskeletal leg cramps   Pulmonary none   ENT none   Endocrine none   Hematological none     Employment:  He currently works full-time as a  in manufacturing, 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 between the hours of 745am till 6:00 p m  Sleep Schedule:       Bedtime:  10:00 p m  On work days and 11:00 p m  On weekends      Latency:  15-30 minutes      Wakeup time:  6:00 a m  On work days and 7:30 a m  On weekends    Awakenings:       Frequency:  He does not experience nighttime awakenings     Daytime Sleepiness / Inappropriate Sleep:       Most severe:  He does not experience daytime sleepiness       Naps :  He rarely takes a nap, less than 1 time per month  If he naps, he typically sleeps for 1-2 hours and naps are refreshing         Inappropriate drowsiness / sleep:  Does not experience any inappropriate drowsiness    Snoring:  Loud snoring    Apnea: His weight has witnessed apnea with gasping    Change in Weight:  Weight has been stable    Restless Leg Syndrome:  He does not experience clinical symptoms consistent with this diagnosis     Other Complaints:  He experiences nocturnal leg cramps, mainly at sleep onset with repositioning  Stretching typically resolves discomfort within seconds to minutes  As a child he walked in his sleep and talked in his sleep  Symptoms have resolved in adulthood  He does not experience sleep paralysis or hallucinations surrounding sleep  He reports bruxism with reported mild evidence and no use of oral appliance  Social History:      Caffeine:  16-24 oz of tea daily, occasionally soda       Tobacco:   reports that he has never smoked  He has never used smokeless tobacco        Alcohol:   reports that he drinks alcohol  Occasional alcohol on weekends      Drugs:   reports that he does not use drugs  The review of systems and following portions of the patient's history were reviewed and updated as appropriate: allergies, current medications, past family history, past medical history, past social history, past surgical history and problem list         Objective:       Vitals:    08/02/19 1000   BP: 122/76   Pulse: 74   Weight: 94 3 kg (208 lb)   Height: 6' (1 829 m)     Body mass index is 28 21 kg/m²  Neck Circumference: 16  Mart Sleepiness Scale:  Total score: 5      Current Outpatient Medications:     ibuprofen (MOTRIN) 600 mg tablet, Take 600 mg by mouth every 6 (six) hours as needed, Disp: , Rfl:     omeprazole (PriLOSEC) 20 mg delayed release capsule, Take 20 mg by mouth, Disp: , Rfl:     polyvinyl alcohol (LIQUIFILM TEARS) 1 4 % ophthalmic solution, Administer 2 drops into the left eye every 2 (two) hours while awake, Disp: 15 mL, Rfl: 0    predniSONE 20 mg tablet, Take 3 tablets (60 mg total) by mouth daily Take 3 tablets daily for next 3 days beginning 3/11, then 2 5 tablets daily for 5 days, 2 tablets daily for 5 days, 1 5 tablets daily for 5 days, 1 tablet daily for 5 days, 0 5 tablet daily for 5 days and stop  (Patient not taking: Reported on 8/2/2019), Disp: 47 tablet, Rfl: 0    Probiotic Product (PROBIOTIC-10 PO), Take by mouth, Disp: , Rfl:     sildenafil (REVATIO) 20 mg tablet, Take one tablet by mouth one hour prior to sex as needed  Do not take more than one dose every 24 hours  , Disp: , Rfl:     tadalafil (CIALIS) 20 MG tablet, Take 1 tablet (20 mg total) by mouth daily as needed for erectile dysfunction (Patient not taking: Reported on 8/2/2019), Disp: 10 tablet, Rfl: 0    Physical Exam  General Appearance:   Alert, cooperative, no distress, appears stated age     Head:   Normocephalic, without obvious abnormality, atraumatic     Eyes:   PERRL, conjunctiva/corneas clear, EOM's intact          Nose:  Nares normal, septum midline, mucosa normal, no drainage or sinus tenderness           Throat:  Lips, teeth and gums normal; tongue normal size and  shape and midline in position; mucosa moist with mild redundancy bilaterally, uvula thick at the base, tonsils absent, Mallampati class 4       Neck:  Supple, symmetrical, trachea midline, no adenopathy; Thyroid: No enlargement, tenderness or nodules; no carotid bruit or JVD     Lungs:      Clear to auscultation bilaterally, respirations unlabored     Heart:   Slightly irregular rate with pause noted rate, S1 and S2 normal, no murmur, rub or gallop       Extremities:  Extremities normal, atraumatic, no cyanosis or edema       Skin:  Skin color, texture, turgor normal, no rashes or lesions       Neurologic:  CNII-XII intact  Normal strength, sensation throughout     Sleep Study Results:  No prior sleep study      ASSESSMENT / PLAN     1  Witnessed episode of apnea  Ambulatory referral to Sleep Medicine    Home Study    CPAP Study   2  Snoring  Home Study   3   Irregular heart rate  CPAP Study         Counseling / Coordination of Care  Total clinic time spent today 60 minutes  Greater than 50% of total time was spent with the patient and / or family counseling and / or coordination of care  A description of the counseling / coordination of care:     instructions for management, risk factor reductions, prognosis, patient and family education, impressions, risks and benefits of treatment options and importance of compliance with treatment    Today we discussed the anatomy and physiology of the upper airway  I pointed out how changes in this region can result in both snoring and abnormal breathing events including apneas and hypopneas  I explained the most common co-morbidities of untreated sleep apnea  After this we talked about some forms of treatment including application of positive airway pressure, mandibular advancement devices and surgery  In order to evaluate the possibility of Obstructive Sleep Apnea as a cause of the patient's symptoms, a home sleep study will be completed to identify the presence or absence of abnormal nocturnal breathing  If significant abnormal nocturnal breathing is detected, nasal CPAP will be titrated to find the optimum pressure needed to maintain upper airway patency during sleep  Following testing, the patient will return to the Sleep [facility] for a review of the test results and to discuss possible treatment options  The following instructions have been given to the patient today:    Patient Instructions   1  Schedule home sleep study with CPAP titration study to follow, if needed  2  Schedule follow up visit to review study results  3   Schedule DME appointment for CPAP equipment, if needed        Juan R Stapleton, [facility]

## 2019-09-03 ENCOUNTER — HOSPITAL ENCOUNTER (OUTPATIENT)
Dept: SLEEP CENTER | Facility: CLINIC | Age: 59
Discharge: HOME | End: 2019-09-03
Payer: COMMERCIAL

## 2019-09-03 DIAGNOSIS — R06.81 WITNESSED EPISODE OF APNEA: ICD-10-CM

## 2019-09-03 DIAGNOSIS — R06.83 SNORING: ICD-10-CM

## 2019-09-03 PROCEDURE — G0399 HOME SLEEP TEST/TYPE 3 PORTA: HCPCS

## 2019-09-03 PROCEDURE — G0399 HOME SLEEP TEST/TYPE 3 PORTA: HCPCS | Performed by: PSYCHIATRY & NEUROLOGY

## 2019-09-06 ENCOUNTER — TELEPHONE (OUTPATIENT)
Dept: SLEEP CENTER | Facility: CLINIC | Age: 59
End: 2019-09-06

## 2019-10-15 ENCOUNTER — TELEPHONE (OUTPATIENT)
Dept: SLEEP CENTER | Facility: CLINIC | Age: 59
End: 2019-10-15

## 2019-10-15 DIAGNOSIS — G47.31 COMPLEX SLEEP APNEA SYNDROME: Primary | ICD-10-CM

## 2019-10-15 NOTE — TELEPHONE ENCOUNTER
Per fax confirmation from pt's insurance, auth for titration study has been denied and APAP is recommended  Please advise

## 2019-10-15 NOTE — PROGRESS NOTES
Home sleep study indicates complex sleep apnea  In lab therapeutic study is needed for appropriate titration  CPAP study with updated diagnosis ordered

## 2019-10-15 NOTE — TELEPHONE ENCOUNTER
10/15/19 3:57pm Per conversation with Juan Pablo [facility] [employer]  Peer to peer set up on 10/16/19 @ 12:15pm with Dr Jazmyn Moore of [address]  They will call you  Please let me know if this time works, or I can reschedule

## 2019-10-15 NOTE — Clinical Note
CPAP study has been reordered with updated diagnosis of complex sleep apnea  Please resubmit with new diagnosis  If denied, need peer to peer

## 2019-10-15 NOTE — TELEPHONE ENCOUNTER
----- Message from Sadia Morales, [address] sent at 10/15/2019 12:38 PM EDT -----  CPAP study has been reordered with updated diagnosis of complex sleep apnea  Please resubmit with new diagnosis  If denied, need peer to peer

## 2019-10-16 NOTE — TELEPHONE ENCOUNTER
Peer to peer call completed regarding therapeutic CPAP titration study    In lab study is approved with approval code - [id number]

## 2019-10-30 ENCOUNTER — HOSPITAL ENCOUNTER (OUTPATIENT)
Dept: SLEEP CENTER | Facility: CLINIC | Age: 59
Discharge: HOME | End: 2019-10-30
Payer: COMMERCIAL

## 2019-10-30 DIAGNOSIS — G47.33 OSA (OBSTRUCTIVE SLEEP APNEA): ICD-10-CM

## 2019-10-30 PROCEDURE — 95811 POLYSOM 6/>YRS CPAP 4/> PARM: CPT

## 2019-10-30 PROCEDURE — 95811 POLYSOM 6/>YRS CPAP 4/> PARM: CPT | Performed by: PSYCHIATRY & NEUROLOGY

## 2019-10-31 ENCOUNTER — TRANSCRIBE ORDERS (OUTPATIENT)
Dept: SLEEP CENTER | Facility: CLINIC | Age: 59
End: 2019-10-31

## 2019-10-31 DIAGNOSIS — R06.81 WITNESSED EPISODE OF APNEA: ICD-10-CM

## 2019-10-31 DIAGNOSIS — G47.33 OSA (OBSTRUCTIVE SLEEP APNEA): Primary | ICD-10-CM

## 2019-10-31 NOTE — PROGRESS NOTES
Sleep Study Documentation    Pre-Sleep Study       Sleep testing procedure explained to patient:YES    Patient napped prior to study:NO    Caffeine:Dayshift worker after 12PM   Caffeine use:YES- tea  6 to 18 ounces    Alcohol:Dayshift workers after 5PM: Alcohol use:NO    Typical day for patient:YES       Study Documentation    Sleep Study Indications: complex sleep apnea    Sleep Study: Treatment   Optimal PAP pressure: 11 cmH2O  Leak:Small  Snore:Eliminated  REM Obtained:yes  Supplemental O2: no    Minimum SaO2 89%  Baseline SaO2 96%  PAP mask tried (list all) [employer] Simplus full face, medium  PAP mask choice (final) [employer] Simplus full face, medium  PAP mask type:full face  PAP pressure at which snoring was eliminated 11 cmH2O  Minimum SaO2 at final PAP pressure 90%  Mode of Therapy:CPAP  ETCO2:No  CPAP changed to BiPAP:No    EKG abnormalities: yes:  EPOCH example and comments: Frequent PVC's on frequent epochs  EEG abnormalities: no    Sleep Study Recorded < 2 hours: N/A    Sleep Study Recorded > 2 hours but incomplete study: N/A    Sleep Study Recorded 6 hours but no sleep obtained: NO    Patient classification: employed       Post-Sleep Study    Medication used at bedtime or during sleep study:YES over the counter sleep aid    Patient reports time it took to fall asleep:20 to 30 minutes    Patient reports waking up during study:1 to 2 times  Patient reports returning to sleep without difficulty  Patient reports sleeping 4 to 6 hours without dreaming  Patient reports sleep during study:typical    Patient rated sleepiness: Somewhat sleepy or tired    PAP treatment:yes: Post PAP treatment patient reports feeling unchanged and would wear PAP mask at home

## 2019-11-05 ENCOUNTER — TELEPHONE (OUTPATIENT)
Dept: SLEEP CENTER | Facility: CLINIC | Age: 59
End: 2019-11-05

## 2019-11-05 DIAGNOSIS — G47.33 OSA (OBSTRUCTIVE SLEEP APNEA): Primary | ICD-10-CM

## 2019-11-05 NOTE — TELEPHONE ENCOUNTER
Patient has DME set-up in [place] 11/13  In-boxed Dr Madeleine Ordaz for DME order- emailed [facility] liaison with set-up date  Left message to call to discuss study results, reminded patient of set-up appointment 11/13

## 2019-11-13 ENCOUNTER — TELEPHONE (OUTPATIENT)
Dept: SLEEP CENTER | Facility: CLINIC | Age: 59
End: 2019-11-13

## 2020-01-14 ENCOUNTER — OFFICE VISIT (OUTPATIENT)
Dept: SLEEP CENTER | Facility: CLINIC | Age: 60
End: 2020-01-14
Payer: COMMERCIAL

## 2020-01-14 VITALS
SYSTOLIC BLOOD PRESSURE: 118 MMHG | WEIGHT: 213.4 LBS | BODY MASS INDEX: 28.91 KG/M2 | HEIGHT: 72 IN | DIASTOLIC BLOOD PRESSURE: 64 MMHG

## 2020-01-14 DIAGNOSIS — G47.31 COMPLEX SLEEP APNEA SYNDROME: Primary | ICD-10-CM

## 2020-01-14 PROCEDURE — 99214 OFFICE O/P EST MOD 30 MIN: CPT | Performed by: NURSE PRACTITIONER

## 2020-01-14 PROCEDURE — 3008F BODY MASS INDEX DOCD: CPT | Performed by: NURSE PRACTITIONER

## 2020-01-14 NOTE — PATIENT INSTRUCTIONS
1   Continue use of CPAP equipment nightly and with naps  2  Continue to clean your equipment, as discussed  3  Monitor AHI and break down of apnea types  We are looking to decrease obstructive apneas and NOT increase clear apneas  If you are noticing increase in clear apneas, call to discuss and we will consider BiPAP trial or BiPAP study  Increase usage  If there is anything causing you to not be able to use it, please call to discuss  3   Contact the [facility] Center with any questions or concerns prior to your next visit, as needed  4  Schedule visit for follow-up in 6 months  Nursing Support:  When: Monday through Friday 7A-5PM except holidays  Where: Our direct line is [phone]  If you are having a true emergency please call 911  In the event that the line is busy or it is after hours please leave a voice message and we will return your call  Please speak clearly, leaving your full name, birth date, best number to reach you and the reason for your call  Medication refills: We will need the name of the medication, the dosage, the ordering provider, whether you get a 30 or 90 day refill, and the pharmacy name and address  Medications will be ordered by the provider only  Nurses cannot call in prescriptions  Please allow 7 days for medication refills  Physician requested updates: If your provider requested that you call with an update after starting medication, please be ready to provide us the medication and dosage, what time you take your medication, the time you attempt to fall asleep, time you fall asleep, when you wake up, and what time you get out of bed  Sleep Study Results: We will contact you with sleep study results and/or next steps after the physician has reviewed your testing

## 2020-01-14 NOTE — PROGRESS NOTES
Progress Note - [address] [facility],[address] [place] 63 y o  male   :1960, MRN: [id number]  2020          Follow Up Evaluation / Problem:     Complex Sleep Apnea    Thank you for the opportunity of participating in the evaluation and care of this patient in the Sleep Clinic at [facility]  HPI: Debby Andres is a 63y o  year old male  The patient presents for follow up of complex sleep apnea  He had concerns of loud snoring with witnessed periods of apnea and gasping, as noted by his wife for many years  He completed a home sleep study, which indicated moderate sleep apnea that becomes severe in supine sleep  He completed a CPAP titration study in late October and began the use of CPAP equipment  He is here to review the results of his sleep studies, as well as compliance data and effectiveness of treatment  Review of Systems      Genitourinary difficulty with erection   Cardiology none   Gastrointestinal none   Neurology none   Constitutional fatigue   Integumentary none   Psychiatry none   Musculoskeletal leg cramps   Pulmonary snoring   ENT none   Endocrine none   Hematological blood donor       Current Outpatient Medications:     omeprazole (PriLOSEC) 20 mg delayed release capsule, Take 20 mg by mouth, Disp: , Rfl:     polyvinyl alcohol (LIQUIFILM TEARS) 1 4 % ophthalmic solution, Administer 2 drops into the left eye every 2 (two) hours while awake, Disp: 15 mL, Rfl: 0    Probiotic Product (PROBIOTIC-10 PO), Take by mouth, Disp: , Rfl:     sildenafil (REVATIO) 20 mg tablet, Take one tablet by mouth one hour prior to sex as needed  Do not take more than one dose every 24 hours  , Disp: , Rfl:     ibuprofen (MOTRIN) 600 mg tablet, Take 600 mg by mouth every 6 (six) hours as needed, Disp: , Rfl:     predniSONE 20 mg tablet, Take 3 tablets (60 mg total) by mouth daily Take 3 tablets daily for next 3 days beginning 3/11, then 2 5 tablets daily for 5 days, 2 tablets daily for 5 days, 1 5 tablets daily for 5 days, 1 tablet daily for 5 days, 0 5 tablet daily for 5 days and stop  (Patient not taking: Reported on 8/2/2019), Disp: 47 tablet, Rfl: 0    tadalafil (CIALIS) 20 MG tablet, Take 1 tablet (20 mg total) by mouth daily as needed for erectile dysfunction (Patient not taking: Reported on 8/2/2019), Disp: 10 tablet, Rfl: 0    Crawford Sleepiness Scale  Sitting and reading: Slight chance of dozing  Watching TV: Slight chance of dozing  Sitting, inactive in a public place (e g  a theatre or a meeting): Would never doze  As a passenger in a car for an hour without a break: Slight chance of dozing  Lying down to rest in the afternoon when circumstances permit: Slight chance of dozing  Sitting and talking to someone: Would never doze  Sitting quietly after a lunch without alcohol: Would never doze  In a car, while stopped for a few minutes in traffic: Would never doze  Total score: 4              Vitals:    01/14/20 0900   BP: 118/64   Weight: 96 8 kg (213 lb 6 4 oz)   Height: 6' (1 829 m)       Body mass index is 28 94 kg/m²  Neck Circumference: 16       EPWORTH SLEEPINESS SCORE  Total score: 4      Past History Since Last Sleep Center Visit:   He denies any changes to his health since his last visit  He completed that home sleep study, followed by a CPAP titration study  He began the use of CPAP  He reports that he has had some difficulty adjusting to the use of the equipment  He has had some nights in which he awakens feeling like he is gasping for air  He removes the mask and catches his breath  When having difficulty, he sleeps without the equipment for the remainder of the night  He reports that over the past week, he has had less of these episodes  The patient reports that he cleans the equipment appropriately and has changed the filter  Results of the home sleep study, completed on 9/3/19, are as follows:   A total of 84 apneas and 114 hypopneas were recorded during the study  Total AHI is 24 9 events per hour  When supine this number increased to 64 9 events per hour  Lowest measured oxygen saturation is 80%  5 4% of the study was spent with an oxygen level below 90%  No other significant abnormalities were identified  These findings are consistent with a diagnosis of moderate to severe obstructive sleep apnea which is far worse in the supine position  A repeat study for titration of nasal CPAP is warranted  Results of the CPAP titration study, completed on 10/30/19 are as follows:  Sleep latency was 27 minutes and 24 seconds  REM latency was 1 hour and 31 minutes  Sleep efficiency was  87 4%  No stage N3 sleep was identified  A total of 36 central and 8 obstructive apneas were identified along with  10 hypopneas  Total AHI was 8 4 events per hour  Minimum oxygen saturation was 89%  The PLMI was 16 2  events per hour  The total arousal index was 10 4 events per hour  Sleep architecture was highly fragmented  initially but improved somewhat over the remainder of the study  No stage N3 sleep was identified  Periodic limb  movements were seen occasionally predominantly in the middle portion and terminal portion of the study  Nasal  CPAP was started at 4 cm and gradually increased to 11 cm  Obstructive apneas were well controlled at 8 cm of  water pressure  Hypopneas persisted necessitating an increase in positive pressure  This resulted in an increase in  the number of central apneas to 18 per hour when using 11 cm of CPAP  Best results seem to be obtained at 10 cm  of water delivered using a CM [employer] Simplus fullface mask  Continued use of this device at the settings is  recommended  This patient should be carefully watched for any changes in the severity of central apnea in the  future  Use of nasal BiPAP might be considered at that time  ASV might also be considered if the LVEF is > 45%      The review of systems and following portions of the patient's history were reviewed and updated as appropriate: allergies, current medications, past family history, past medical history, past social history, past surgical history, and problem list         OBJECTIVE    PAP Pressure: Nasal CPAP set to deliver 10 cm of water pressure  DME Provider: [employer]    Physical Exam:     General Appearance:   Alert, cooperative, no distress, appears stated age, overweight     Head:   Normocephalic, without obvious abnormality, atraumatic     Eyes:   PERRL, conjunctiva/corneas clear, EOM's intact          Nose:  Nares normal, septum midline, no drainage or sinus tenderness           Throat:  Lips, teeth and gums normal; tongue normal size and  shape and midline mucosa moist and redundant bilaterally, uvula thick at the base and dipping below the base of the tongue, tonsils absent, Mallampati class 4       Neck:  Supple, symmetrical, trachea midline, no adenopathy; Thyroid: No enlargement, tenderness or nodules; no carotid bruit or JVD     Lungs:      Clear to auscultation bilaterally, respirations unlabored     Heart:   Regular rate and rhythm, S1 and S2 normal, no murmur, rub or gallop       Extremities:  Extremities normal, atraumatic, no cyanosis or edema       Skin:  Skin color, texture, turgor normal, no rashes or lesions       Neurologic:  No focal deficits noted       ASSESSMENT / PLAN    1  Complex sleep apnea syndrome  PAP DME Pressure Change    PAP DME Resupply/Reorder           Counseling / Coordination of Care  Total clinic time spent today 30 minutes  Greater than 50% of total time was spent with the patient and / or family counseling and / or coordination of care       A description of the counseling / coordination of care:     Impressions, Diagnostic results, Prognosis, Instructions for management, Risks and benefits of treatment, Patient and family education, Risk factor reductions and Importance of compliance with treatment    Today I reviewed the patient's compliance data  he has been able to use the equipment 80% of all days recorded  Average usage was 4 or more hours 66 7% of all days recorded  The estimated AHI is 10 2 abnormal breathing events per hour  A detailed report was run as well, which indicates a mix of both obstructive and clear apneas with the majority being obstructive apneas  A pressure change has been made to increase to 10 5cm of water pressure  This increase will need to be done cautiously, as there was an increase in clear apneas with increase in pressure during the titration study  He feels he is adjusting to the use of the equipment  He has been using the [employer]  kirit to view his AHI, usage and mask fit  He will continue to view this and call if the obstructive apneas are not decreasing or if the clear apneas are increasing with the change in pressure  We discussed that he may need to have a trial of AutoBiPAP or complete a BiPAP titration study if AHI is not improving  A slight improvement in [place] is noted  The patient feels he benefits from the use of PAP equipment and would like to continue PAP therapy  Response to treatment has been good with some difficulty noted  He will continue using this equipment at the settings noted above for the next 6 months  At that timehe will then return for a routine follow-up evaluation  I have asked the patient to contact the [facility] if he encounters any difficulties prior to that time  The following instructions have been given to the patient today:    Patient Instructions   1  Continue use of CPAP equipment nightly and with naps  2  Continue to clean your equipment, as discussed  3  Monitor AHI and break down of apnea types  We are looking to decrease obstructive apneas and NOT increase clear apneas  If you are noticing increase in clear apneas, call to discuss and we will consider BiPAP trial or BiPAP study  Increase usage  If there is anything causing you to not be able to use it, please call to discuss  3   Contact the [facility] with any questions or concerns prior to your next visit, as needed  4  Schedule visit for follow-up in 6 months  Nursing Support:  When: Monday through Friday 7A-5PM except holidays  Where: Our direct line is [phone]  If you are having a true emergency please call 911  In the event that the line is busy or it is after hours please leave a voice message and we will return your call  Please speak clearly, leaving your full name, birth date, best number to reach you and the reason for your call  Medication refills: We will need the name of the medication, the dosage, the ordering provider, whether you get a 30 or 90 day refill, and the pharmacy name and address  Medications will be ordered by the provider only  Nurses cannot call in prescriptions  Please allow 7 days for medication refills  Physician requested updates: If your provider requested that you call with an update after starting medication, please be ready to provide us the medication and dosage, what time you take your medication, the time you attempt to fall asleep, time you fall asleep, when you wake up, and what time you get out of bed  Sleep Study Results: We will contact you with sleep study results and/or next steps after the physician has reviewed your testing        Jennifer Virgen, [facility]

## 2020-01-15 ENCOUNTER — TELEPHONE (OUTPATIENT)
Dept: SLEEP CENTER | Facility: CLINIC | Age: 60
End: 2020-01-15

## 2023-03-16 ENCOUNTER — APPOINTMENT (RX ONLY)
Dept: URBAN - METROPOLITAN AREA CLINIC 94 | Facility: CLINIC | Age: 63
Setting detail: DERMATOLOGY
End: 2023-03-16

## 2023-03-16 DIAGNOSIS — D18.0 HEMANGIOMA: ICD-10-CM

## 2023-03-16 DIAGNOSIS — L81.4 OTHER MELANIN HYPERPIGMENTATION: ICD-10-CM

## 2023-03-16 DIAGNOSIS — D22 MELANOCYTIC NEVI: ICD-10-CM

## 2023-03-16 PROBLEM — D18.01 HEMANGIOMA OF SKIN AND SUBCUTANEOUS TISSUE: Status: ACTIVE | Noted: 2023-03-16

## 2023-03-16 PROBLEM — D22.61 MELANOCYTIC NEVI OF RIGHT UPPER LIMB, INCLUDING SHOULDER: Status: ACTIVE | Noted: 2023-03-16

## 2023-03-16 PROBLEM — D22.5 MELANOCYTIC NEVI OF TRUNK: Status: ACTIVE | Noted: 2023-03-16

## 2023-03-16 PROBLEM — D22.71 MELANOCYTIC NEVI OF RIGHT LOWER LIMB, INCLUDING HIP: Status: ACTIVE | Noted: 2023-03-16

## 2023-03-16 PROBLEM — D22.62 MELANOCYTIC NEVI OF LEFT UPPER LIMB, INCLUDING SHOULDER: Status: ACTIVE | Noted: 2023-03-16

## 2023-03-16 PROBLEM — D22.72 MELANOCYTIC NEVI OF LEFT LOWER LIMB, INCLUDING HIP: Status: ACTIVE | Noted: 2023-03-16

## 2023-03-16 PROCEDURE — ? COUNSELING

## 2023-03-16 PROCEDURE — 99213 OFFICE O/P EST LOW 20 MIN: CPT

## 2023-03-16 ASSESSMENT — LOCATION SIMPLE DESCRIPTION DERM
LOCATION SIMPLE: LEFT FOREARM
LOCATION SIMPLE: RIGHT THIGH
LOCATION SIMPLE: ABDOMEN
LOCATION SIMPLE: LEFT SHOULDER
LOCATION SIMPLE: LEFT THIGH
LOCATION SIMPLE: RIGHT PRETIBIAL REGION
LOCATION SIMPLE: LEFT CALF
LOCATION SIMPLE: LEFT UPPER BACK
LOCATION SIMPLE: LEFT PRETIBIAL REGION
LOCATION SIMPLE: LEFT POSTERIOR THIGH
LOCATION SIMPLE: RIGHT FOREARM
LOCATION SIMPLE: RIGHT CALF
LOCATION SIMPLE: RIGHT POSTERIOR THIGH
LOCATION SIMPLE: CHEST
LOCATION SIMPLE: RIGHT SHOULDER

## 2023-03-16 ASSESSMENT — LOCATION DETAILED DESCRIPTION DERM
LOCATION DETAILED: EPIGASTRIC SKIN
LOCATION DETAILED: LEFT DISTAL CALF
LOCATION DETAILED: RIGHT DISTAL POSTERIOR THIGH
LOCATION DETAILED: LEFT PROXIMAL PRETIBIAL REGION
LOCATION DETAILED: RIGHT POSTERIOR SHOULDER
LOCATION DETAILED: LEFT VENTRAL PROXIMAL FOREARM
LOCATION DETAILED: RIGHT DISTAL DORSAL FOREARM
LOCATION DETAILED: RIGHT VENTRAL PROXIMAL FOREARM
LOCATION DETAILED: RIGHT ANTERIOR PROXIMAL THIGH
LOCATION DETAILED: LEFT POSTERIOR SHOULDER
LOCATION DETAILED: LEFT DISTAL POSTERIOR THIGH
LOCATION DETAILED: RIGHT PROXIMAL PRETIBIAL REGION
LOCATION DETAILED: LEFT LATERAL SUPERIOR CHEST
LOCATION DETAILED: LEFT PROXIMAL DORSAL FOREARM
LOCATION DETAILED: PERIUMBILICAL SKIN
LOCATION DETAILED: RIGHT PROXIMAL CALF
LOCATION DETAILED: LEFT ANTERIOR PROXIMAL THIGH
LOCATION DETAILED: RIGHT ANTERIOR SHOULDER
LOCATION DETAILED: LEFT ANTERIOR SHOULDER
LOCATION DETAILED: LEFT INFERIOR UPPER BACK

## 2023-03-16 ASSESSMENT — LOCATION ZONE DERM
LOCATION ZONE: ARM
LOCATION ZONE: LEG
LOCATION ZONE: TRUNK